# Patient Record
Sex: FEMALE | Race: BLACK OR AFRICAN AMERICAN | Employment: FULL TIME | URBAN - METROPOLITAN AREA
[De-identification: names, ages, dates, MRNs, and addresses within clinical notes are randomized per-mention and may not be internally consistent; named-entity substitution may affect disease eponyms.]

---

## 2020-10-10 ENCOUNTER — APPOINTMENT (OUTPATIENT)
Dept: GENERAL RADIOLOGY | Age: 45
End: 2020-10-10
Attending: NURSE PRACTITIONER
Payer: COMMERCIAL

## 2020-10-10 ENCOUNTER — HOSPITAL ENCOUNTER (OUTPATIENT)
Age: 45
Setting detail: OBSERVATION
Discharge: HOME | End: 2020-10-13
Attending: EMERGENCY MEDICINE | Admitting: HOSPITALIST
Payer: COMMERCIAL

## 2020-10-10 DIAGNOSIS — R07.9 CHEST PAIN, UNSPECIFIED TYPE: Primary | ICD-10-CM

## 2020-10-10 LAB
ALBUMIN SERPL-MCNC: 3.6 G/DL (ref 3.5–5)
ALBUMIN/GLOB SERPL: 0.9 {RATIO} (ref 1.1–2.2)
ALP SERPL-CCNC: 90 U/L (ref 45–117)
ALT SERPL-CCNC: 20 U/L (ref 12–78)
ANION GAP SERPL CALC-SCNC: 7 MMOL/L (ref 5–15)
AST SERPL-CCNC: 16 U/L (ref 15–37)
BASOPHILS # BLD: 0.1 K/UL (ref 0–0.1)
BASOPHILS NFR BLD: 0 % (ref 0–1)
BILIRUB SERPL-MCNC: 0.3 MG/DL (ref 0.2–1)
BNP SERPL-MCNC: 34 PG/ML
BUN SERPL-MCNC: 13 MG/DL (ref 6–20)
BUN/CREAT SERPL: 16 (ref 12–20)
CALCIUM SERPL-MCNC: 9.1 MG/DL (ref 8.5–10.1)
CHLORIDE SERPL-SCNC: 109 MMOL/L (ref 97–108)
CO2 SERPL-SCNC: 23 MMOL/L (ref 21–32)
COMMENT, HOLDF: NORMAL
CREAT SERPL-MCNC: 0.8 MG/DL (ref 0.55–1.02)
DIFFERENTIAL METHOD BLD: ABNORMAL
EOSINOPHIL # BLD: 0.1 K/UL (ref 0–0.4)
EOSINOPHIL NFR BLD: 1 % (ref 0–7)
ERYTHROCYTE [DISTWIDTH] IN BLOOD BY AUTOMATED COUNT: 15.2 % (ref 11.5–14.5)
GLOBULIN SER CALC-MCNC: 4.2 G/DL (ref 2–4)
GLUCOSE SERPL-MCNC: 140 MG/DL (ref 65–100)
HCT VFR BLD AUTO: 43.6 % (ref 35–47)
HGB BLD-MCNC: 13.4 G/DL (ref 11.5–16)
IMM GRANULOCYTES # BLD AUTO: 0.1 K/UL (ref 0–0.04)
IMM GRANULOCYTES NFR BLD AUTO: 0 % (ref 0–0.5)
LIPASE SERPL-CCNC: 67 U/L (ref 73–393)
LYMPHOCYTES # BLD: 2 K/UL (ref 0.8–3.5)
LYMPHOCYTES NFR BLD: 18 % (ref 12–49)
MAGNESIUM SERPL-MCNC: 2.1 MG/DL (ref 1.6–2.4)
MCH RBC QN AUTO: 23.8 PG (ref 26–34)
MCHC RBC AUTO-ENTMCNC: 30.7 G/DL (ref 30–36.5)
MCV RBC AUTO: 77.4 FL (ref 80–99)
MONOCYTES # BLD: 0.8 K/UL (ref 0–1)
MONOCYTES NFR BLD: 7 % (ref 5–13)
NEUTS SEG # BLD: 8.3 K/UL (ref 1.8–8)
NEUTS SEG NFR BLD: 74 % (ref 32–75)
NRBC # BLD: 0 K/UL (ref 0–0.01)
NRBC BLD-RTO: 0 PER 100 WBC
PLATELET # BLD AUTO: 252 K/UL (ref 150–400)
PMV BLD AUTO: 11 FL (ref 8.9–12.9)
POTASSIUM SERPL-SCNC: 3.4 MMOL/L (ref 3.5–5.1)
PROT SERPL-MCNC: 7.8 G/DL (ref 6.4–8.2)
RBC # BLD AUTO: 5.63 M/UL (ref 3.8–5.2)
SAMPLES BEING HELD,HOLD: NORMAL
SODIUM SERPL-SCNC: 139 MMOL/L (ref 136–145)
TROPONIN I SERPL-MCNC: <0.05 NG/ML
WBC # BLD AUTO: 11.2 K/UL (ref 3.6–11)

## 2020-10-10 PROCEDURE — 99218 HC RM OBSERVATION: CPT

## 2020-10-10 PROCEDURE — 83690 ASSAY OF LIPASE: CPT

## 2020-10-10 PROCEDURE — 36415 COLL VENOUS BLD VENIPUNCTURE: CPT

## 2020-10-10 PROCEDURE — 96374 THER/PROPH/DIAG INJ IV PUSH: CPT

## 2020-10-10 PROCEDURE — 83880 ASSAY OF NATRIURETIC PEPTIDE: CPT

## 2020-10-10 PROCEDURE — 74011250637 HC RX REV CODE- 250/637: Performed by: NURSE PRACTITIONER

## 2020-10-10 PROCEDURE — 96372 THER/PROPH/DIAG INJ SC/IM: CPT

## 2020-10-10 PROCEDURE — 83735 ASSAY OF MAGNESIUM: CPT

## 2020-10-10 PROCEDURE — 71046 X-RAY EXAM CHEST 2 VIEWS: CPT

## 2020-10-10 PROCEDURE — 74011250636 HC RX REV CODE- 250/636: Performed by: NURSE PRACTITIONER

## 2020-10-10 PROCEDURE — 80053 COMPREHEN METABOLIC PANEL: CPT

## 2020-10-10 PROCEDURE — 85025 COMPLETE CBC W/AUTO DIFF WBC: CPT

## 2020-10-10 PROCEDURE — 84484 ASSAY OF TROPONIN QUANT: CPT

## 2020-10-10 PROCEDURE — 93005 ELECTROCARDIOGRAM TRACING: CPT

## 2020-10-10 PROCEDURE — 99285 EMERGENCY DEPT VISIT HI MDM: CPT

## 2020-10-10 RX ORDER — SODIUM CHLORIDE 0.9 % (FLUSH) 0.9 %
5-40 SYRINGE (ML) INJECTION EVERY 8 HOURS
Status: DISCONTINUED | OUTPATIENT
Start: 2020-10-10 | End: 2020-10-13 | Stop reason: HOSPADM

## 2020-10-10 RX ORDER — ACETAMINOPHEN 500 MG
1000 TABLET ORAL
Status: COMPLETED | OUTPATIENT
Start: 2020-10-10 | End: 2020-10-10

## 2020-10-10 RX ORDER — FENTANYL CITRATE 50 UG/ML
50 INJECTION, SOLUTION INTRAMUSCULAR; INTRAVENOUS
Status: DISCONTINUED | OUTPATIENT
Start: 2020-10-10 | End: 2020-10-13 | Stop reason: HOSPADM

## 2020-10-10 RX ORDER — SODIUM CHLORIDE 0.9 % (FLUSH) 0.9 %
5-40 SYRINGE (ML) INJECTION AS NEEDED
Status: DISCONTINUED | OUTPATIENT
Start: 2020-10-10 | End: 2020-10-13 | Stop reason: HOSPADM

## 2020-10-10 RX ORDER — HEPARIN SODIUM 5000 [USP'U]/ML
5000 INJECTION, SOLUTION INTRAVENOUS; SUBCUTANEOUS EVERY 8 HOURS
Status: DISCONTINUED | OUTPATIENT
Start: 2020-10-10 | End: 2020-10-13 | Stop reason: HOSPADM

## 2020-10-10 RX ORDER — TRAMADOL HYDROCHLORIDE 50 MG/1
50 TABLET ORAL
Status: DISCONTINUED | OUTPATIENT
Start: 2020-10-10 | End: 2020-10-13 | Stop reason: HOSPADM

## 2020-10-10 RX ORDER — ONDANSETRON 2 MG/ML
4 INJECTION INTRAMUSCULAR; INTRAVENOUS
Status: COMPLETED | OUTPATIENT
Start: 2020-10-10 | End: 2020-10-10

## 2020-10-10 RX ORDER — NITROGLYCERIN 0.4 MG/1
0.4 TABLET SUBLINGUAL
Status: DISCONTINUED | OUTPATIENT
Start: 2020-10-10 | End: 2020-10-13 | Stop reason: HOSPADM

## 2020-10-10 RX ADMIN — ACETAMINOPHEN 1000 MG: 500 TABLET ORAL at 22:28

## 2020-10-10 RX ADMIN — ONDANSETRON 4 MG: 2 INJECTION INTRAMUSCULAR; INTRAVENOUS at 22:29

## 2020-10-10 RX ADMIN — NITROGLYCERIN 0.4 MG: 0.4 TABLET, ORALLY DISINTEGRATING SUBLINGUAL at 21:30

## 2020-10-11 ENCOUNTER — APPOINTMENT (OUTPATIENT)
Dept: VASCULAR SURGERY | Age: 45
End: 2020-10-11
Attending: NURSE PRACTITIONER
Payer: COMMERCIAL

## 2020-10-11 LAB
APPEARANCE UR: CLEAR
ATRIAL RATE: 55 BPM
ATRIAL RATE: 77 BPM
BACTERIA URNS QL MICRO: ABNORMAL /HPF
BILIRUB UR QL: NEGATIVE
CALCULATED P AXIS, ECG09: 23 DEGREES
CALCULATED P AXIS, ECG09: 34 DEGREES
CALCULATED R AXIS, ECG10: 10 DEGREES
CALCULATED R AXIS, ECG10: 13 DEGREES
CALCULATED T AXIS, ECG11: 28 DEGREES
CALCULATED T AXIS, ECG11: 56 DEGREES
CHOLEST SERPL-MCNC: 152 MG/DL
CHOLEST SERPL-MCNC: 165 MG/DL
COLOR UR: ABNORMAL
DIAGNOSIS, 93000: NORMAL
DIAGNOSIS, 93000: NORMAL
EPITH CASTS URNS QL MICRO: ABNORMAL /LPF
EST. AVERAGE GLUCOSE BLD GHB EST-MCNC: 117 MG/DL
GLUCOSE UR STRIP.AUTO-MCNC: NEGATIVE MG/DL
HBA1C MFR BLD: 5.7 % (ref 4–5.6)
HDLC SERPL-MCNC: 76 MG/DL
HDLC SERPL-MCNC: 77 MG/DL
HDLC SERPL: 2 {RATIO} (ref 0–5)
HDLC SERPL: 2.2 {RATIO} (ref 0–5)
HGB UR QL STRIP: ABNORMAL
KETONES UR QL STRIP.AUTO: ABNORMAL MG/DL
LDLC SERPL CALC-MCNC: 58.6 MG/DL (ref 0–100)
LDLC SERPL CALC-MCNC: 77.8 MG/DL (ref 0–100)
LEUKOCYTE ESTERASE UR QL STRIP.AUTO: NEGATIVE
LIPID PROFILE,FLP: NORMAL
LIPID PROFILE,FLP: NORMAL
MUCOUS THREADS URNS QL MICRO: ABNORMAL /LPF
NITRITE UR QL STRIP.AUTO: NEGATIVE
P-R INTERVAL, ECG05: 140 MS
P-R INTERVAL, ECG05: 142 MS
PH UR STRIP: 5 [PH] (ref 5–8)
PROT UR STRIP-MCNC: ABNORMAL MG/DL
Q-T INTERVAL, ECG07: 364 MS
Q-T INTERVAL, ECG07: 452 MS
QRS DURATION, ECG06: 76 MS
QRS DURATION, ECG06: 80 MS
QTC CALCULATION (BEZET), ECG08: 411 MS
QTC CALCULATION (BEZET), ECG08: 432 MS
RBC #/AREA URNS HPF: ABNORMAL /HPF (ref 0–5)
SP GR UR REFRACTOMETRY: 1.03 (ref 1–1.03)
TRIGL SERPL-MCNC: 56 MG/DL (ref ?–150)
TRIGL SERPL-MCNC: 82 MG/DL (ref ?–150)
TROPONIN I SERPL-MCNC: <0.05 NG/ML
UR CULT HOLD, URHOLD: NORMAL
UROBILINOGEN UR QL STRIP.AUTO: 1 EU/DL (ref 0.2–1)
VENTRICULAR RATE, ECG03: 55 BPM
VENTRICULAR RATE, ECG03: 77 BPM
VLDLC SERPL CALC-MCNC: 11.2 MG/DL
VLDLC SERPL CALC-MCNC: 16.4 MG/DL
WBC URNS QL MICRO: ABNORMAL /HPF (ref 0–4)

## 2020-10-11 PROCEDURE — 81001 URINALYSIS AUTO W/SCOPE: CPT

## 2020-10-11 PROCEDURE — 74011250637 HC RX REV CODE- 250/637: Performed by: NURSE PRACTITIONER

## 2020-10-11 PROCEDURE — 99218 HC RM OBSERVATION: CPT

## 2020-10-11 PROCEDURE — 96372 THER/PROPH/DIAG INJ SC/IM: CPT

## 2020-10-11 PROCEDURE — 93005 ELECTROCARDIOGRAM TRACING: CPT

## 2020-10-11 PROCEDURE — 80061 LIPID PANEL: CPT

## 2020-10-11 PROCEDURE — 93880 EXTRACRANIAL BILAT STUDY: CPT

## 2020-10-11 PROCEDURE — 36415 COLL VENOUS BLD VENIPUNCTURE: CPT

## 2020-10-11 PROCEDURE — 84484 ASSAY OF TROPONIN QUANT: CPT

## 2020-10-11 PROCEDURE — 74011250637 HC RX REV CODE- 250/637: Performed by: HOSPITALIST

## 2020-10-11 PROCEDURE — 83036 HEMOGLOBIN GLYCOSYLATED A1C: CPT

## 2020-10-11 PROCEDURE — 74011250636 HC RX REV CODE- 250/636: Performed by: HOSPITALIST

## 2020-10-11 RX ORDER — IBUPROFEN 600 MG/1
600 TABLET ORAL ONCE
Status: COMPLETED | OUTPATIENT
Start: 2020-10-11 | End: 2020-10-11

## 2020-10-11 RX ADMIN — HEPARIN SODIUM 5000 UNITS: 5000 INJECTION INTRAVENOUS; SUBCUTANEOUS at 23:43

## 2020-10-11 RX ADMIN — HEPARIN SODIUM 5000 UNITS: 5000 INJECTION INTRAVENOUS; SUBCUTANEOUS at 00:43

## 2020-10-11 RX ADMIN — IBUPROFEN 600 MG: 600 TABLET, FILM COATED ORAL at 09:54

## 2020-10-11 RX ADMIN — HEPARIN SODIUM 5000 UNITS: 5000 INJECTION INTRAVENOUS; SUBCUTANEOUS at 15:21

## 2020-10-11 RX ADMIN — HEPARIN SODIUM 5000 UNITS: 5000 INJECTION INTRAVENOUS; SUBCUTANEOUS at 06:48

## 2020-10-11 RX ADMIN — TRAMADOL HYDROCHLORIDE 50 MG: 50 TABLET ORAL at 15:21

## 2020-10-11 NOTE — CONSULTS
Cardiology Note dictated # [id number]  Imp:   Atypical CP with strong pleuritic and tactile component  Plan:  Agree with stress test as ordered  Further recommendations to follow  Thank you for this referral  Pramod San MD  Interventional Cardiology   [facility]

## 2020-10-11 NOTE — H&P
HISTORY AND PHYSICAL      PCP: None  History source: the patient      CC: chest pain      HPI: 41 y.o lady with asthma and GERD who presents with chest pain. Symptoms began earlier in the evening. She finished eating and developed sharp substernal chest pain. No exacerbating or alleviating factors. Associated symptoms include nausea, flushing, and left hand paresthesias. It lasted for about 15 minutes until EMS came and gave her nitroglycerin. She denies tobacco use, family history of heart disease. About five years ago, she had a stress test for palpitations that was reportedly normal. She then had what sounds like a loop recorder which was removed about a year ago. She is currently pain-free      PMH/PSH:  Past Medical History:   Diagnosis Date    Abnormal Pap smear     2008    Asthma     Asthma     GERD (gastroesophageal reflux disease)      Past Surgical History:   Procedure Laterality Date    HX OTHER SURGICAL      d&c 2005       Home meds:   Prior to Admission medications    Medication Sig Start Date End Date Taking? Authorizing Provider   cephALEXin (KEFLEX) 500 mg capsule Take 1 Cap by mouth three (3) times daily. 3/12/14   Angelica Joseph, DO   albuterol (PROVENTIL HFA, VENTOLIN HFA) 90 mcg/actuation inhaler Take 2 Puffs by inhalation every four (4) hours as needed. Indications: BRONCHOSPASM PREVENTION    Provider, Historical       Allergies:  No Known Allergies    FH:  Family History   Problem Relation Age of Onset    Cancer Father     Stroke Maternal Grandfather     Hypertension Maternal Grandfather     Diabetes Paternal Grandmother     Hypertension Paternal Grandmother        SH:  Social History     Tobacco Use    Smoking status: Never Smoker   Substance Use Topics    Alcohol use: No       ROS: A comprehensive review of systems was negative except for that written in the HPI.       PHYSICAL EXAM:  Visit Vitals  /86 (BP 1 Location: Left arm, BP Patient Position: At rest)   Pulse 72 Resp 16   SpO2 97%       Gen: NAD, non-toxic  HEENT: anicteric sclerae, normal conjunctiva, oropharynx clear, MM moist  Neck: supple, trachea midline, no adenopathy  Heart: RRR, soft systolic murmur, no JVD, no peripheral edema. Pain is reproducible with palpation of left sternochondral junctions  Lungs: CTA b/l, non-labored respirations  Abd: soft, NT, ND, BS+  Extr: warm  Skin: dry, no rash  Neuro: CN II-XII grossly intact, normal speech, moves all extremities  Psych: normal mood, appropriate affect      Labs/Imaging:  Recent Results (from the past 24 hour(s))   EKG, 12 LEAD, INITIAL    Collection Time: 10/10/20  9:18 PM   Result Value Ref Range    Ventricular Rate 77 BPM    Atrial Rate 77 BPM    P-R Interval 140 ms    QRS Duration 76 ms    Q-T Interval 364 ms    QTC Calculation (Bezet) 411 ms    Calculated P Axis 34 degrees    Calculated R Axis 10 degrees    Calculated T Axis 56 degrees    Diagnosis       Normal sinus rhythm  Nonspecific T wave abnormality  When compared with ECG of 11-MAR-2014 22:12,  T wave inversion no longer evident in Inferior leads  Nonspecific T wave abnormality has replaced inverted T waves in Anterolateral   leads     CBC WITH AUTOMATED DIFF    Collection Time: 10/10/20  9:26 PM   Result Value Ref Range    WBC 11.2 (H) 3.6 - 11.0 K/uL    RBC 5.63 (H) 3.80 - 5.20 M/uL    HGB 13.4 11.5 - 16.0 g/dL    HCT 43.6 35.0 - 47.0 %    MCV 77.4 (L) 80.0 - 99.0 FL    MCH 23.8 (L) 26.0 - 34.0 PG    MCHC 30.7 30.0 - 36.5 g/dL    RDW 15.2 (H) 11.5 - 14.5 %    PLATELET 487 710 - 078 K/uL    MPV 11.0 8.9 - 12.9 FL    NRBC 0.0 0  WBC    ABSOLUTE NRBC 0.00 0.00 - 0.01 K/uL    NEUTROPHILS 74 32 - 75 %    LYMPHOCYTES 18 12 - 49 %    MONOCYTES 7 5 - 13 %    EOSINOPHILS 1 0 - 7 %    BASOPHILS 0 0 - 1 %    IMMATURE GRANULOCYTES 0 0.0 - 0.5 %    ABS. NEUTROPHILS 8.3 (H) 1.8 - 8.0 K/UL    ABS. LYMPHOCYTES 2.0 0.8 - 3.5 K/UL    ABS. MONOCYTES 0.8 0.0 - 1.0 K/UL    ABS.  EOSINOPHILS 0.1 0.0 - 0.4 K/UL ABS. BASOPHILS 0.1 0.0 - 0.1 K/UL    ABS. IMM. GRANS. 0.1 (H) 0.00 - 0.04 K/UL    DF AUTOMATED     METABOLIC PANEL, COMPREHENSIVE    Collection Time: 10/10/20  9:26 PM   Result Value Ref Range    Sodium 139 136 - 145 mmol/L    Potassium 3.4 (L) 3.5 - 5.1 mmol/L    Chloride 109 (H) 97 - 108 mmol/L    CO2 23 21 - 32 mmol/L    Anion gap 7 5 - 15 mmol/L    Glucose 140 (H) 65 - 100 mg/dL    BUN 13 6 - 20 MG/DL    Creatinine 0.80 0.55 - 1.02 MG/DL    BUN/Creatinine ratio 16 12 - 20      GFR est AA >60 >60 ml/min/1.73m2    GFR est non-AA >60 >60 ml/min/1.73m2    Calcium 9.1 8.5 - 10.1 MG/DL    Bilirubin, total 0.3 0.2 - 1.0 MG/DL    ALT (SGPT) 20 12 - 78 U/L    AST (SGOT) 16 15 - 37 U/L    Alk. phosphatase 90 45 - 117 U/L    Protein, total 7.8 6.4 - 8.2 g/dL    Albumin 3.6 3.5 - 5.0 g/dL    Globulin 4.2 (H) 2.0 - 4.0 g/dL    A-G Ratio 0.9 (L) 1.1 - 2.2     TROPONIN I    Collection Time: 10/10/20  9:26 PM   Result Value Ref Range    Troponin-I, Qt. <0.05 <0.05 ng/mL   NT-PRO BNP    Collection Time: 10/10/20  9:26 PM   Result Value Ref Range    NT pro-BNP 34 <125 PG/ML   LIPASE    Collection Time: 10/10/20  9:26 PM   Result Value Ref Range    Lipase 67 (L) 73 - 393 U/L   MAGNESIUM    Collection Time: 10/10/20  9:26 PM   Result Value Ref Range    Magnesium 2.1 1.6 - 2.4 mg/dL   SAMPLES BEING HELD    Collection Time: 10/10/20  9:26 PM   Result Value Ref Range    SAMPLES BEING HELD 1RED,1BLU     COMMENT        Add-on orders for these samples will be processed based on acceptable specimen integrity and analyte stability, which may vary by analyte.        Recent Labs     10/10/20  2126   WBC 11.2*   HGB 13.4   HCT 43.6        Recent Labs     10/10/20  2126      K 3.4*   *   CO2 23   BUN 13   CREA 0.80   *   CA 9.1   MG 2.1     Recent Labs     10/10/20  2126   ALT 20   AP 90   TBILI 0.3   TP 7.8   ALB 3.6   GLOB 4.2*   LPSE 67*       Recent Labs     10/10/20  2126   TROIQ <0.05       No results for input(s): INR, PTP, APTT, INREXT in the last 72 hours. No results for input(s): PH, PCO2, PO2 in the last 72 hours.       Xr Chest Pa Lat    Result Date: 10/10/2020  IMPRESSION: Normal chest.          Assessment & Plan:     Chest pain: seems non-cardiac in nature  -trend biomarkers  -monitor on tele  -stress test in AM, consult cardiology in AM  -check a1c, lipid panel    Asthma: stable    GERD    DVT ppx: sq heparin  Code status: full  Disposition: home when ready    Signed By: Carlos Arias MD     October 10, 2020

## 2020-10-11 NOTE — PROGRESS NOTES
[facility]  Hospitalist Group                                                                                          Hospitalist Progress Note  Willow Aguliar NP  Answering service: [phone] or [phone] from in house phone        Date of Service:  10/11/2020  NAME:  Yane Be  :  1975  MRN:  [id number]      Admission Summary:   Per H&P: HPI: 41 y.o lady with asthma and GERD who presents with chest pain. Symptoms began earlier in the evening. She finished eating and developed sharp substernal chest pain. No exacerbating or alleviating factors. Associated symptoms include nausea, flushing, and left hand paresthesias. It lasted for about 15 minutes until EMS came and gave her nitroglycerin. She denies tobacco use, family history of heart disease. About five years ago, she had a stress test for palpitations that was reportedly normal. She then had what sounds like a loop recorder which was removed about a year ago. She is currently pain-free    Interval history / Subjective:    Seen and examined patient for follow up of chest pain. States that the chest pain has resolved. Currently only having chest soreness that increases with deep breathing. Complaining of headache. States headache started after having Nitroglycerin last night. Explained to her that headache is a side effect of the nitro. States that she did have a loop monitor previously but was not treated for anything. Was following with a cardiologist in [place] but has not been there recently. No acute distress noted. Denies any syncope, dizziness, shortness of breath, nausea, vomiting or diarrhea. Assessment & Plan:     Atypical chest pain   - Trop's negative x 4   - A1c and lipid panel completed. - Stress test pending   - Cardiology following   - Continue telemetry   - Carotid duplex showing incidental finding of 2 thyroid nodules that are cystic in appearance. Discussed with patient.  To be followed up outpatient with PCP. H/o asthma   - Stable       Code status: Full   DVT prophylaxis: SCDs    Care Plan discussed with: Patient/Family and Nurse  Anticipated Disposition: Home w/Family  Anticipated Discharge: 24 hours to 48 hours     Hospital Problems  Never Reviewed          Codes Class Noted POA    Chest pain ICD-10-CM: R07.9  ICD-9-CM: 786.50  10/10/2020 Unknown                Review of Systems:   A comprehensive review of systems was negative except for that written in the HPI. Vital Signs:    Last 24hrs VS reviewed since prior progress note. Most recent are:  Visit Vitals  /84 (BP 1 Location: Left arm, BP Patient Position: At rest)   Pulse 72   Temp 98.8 °F (37.1 °C)   Resp 15   SpO2 99%         Intake/Output Summary (Last 24 hours) at 10/11/2020 1732  Last data filed at 10/11/2020 1520  Gross per 24 hour   Intake 240 ml   Output --   Net 240 ml        Physical Examination:             Constitutional:  No acute distress, cooperative, pleasant, answers questions   ENT:  Oral mucosa moist, oropharynx benign. Resp:  CTA bilaterally. On room air. No wheezing/rhonchi/rales. No accessory muscle use   CV:  Regular rhythm, normal rate, no murmurs, gallops, rubs. Chest tenderness to palpation. GI:  Soft, non distended, non tender. normoactive bowel sounds, no hepatosplenomegaly     Musculoskeletal:  No edema, warm, 2+ pulses throughout    Neurologic:  Moves all extremities. Follows commands AAOx3, CN II-XII reviewed     Psych:  Good insight, Not anxious nor agitated.   Skin:  Good turgor, no rashes or ulcers       Data Review:    Review and/or order of clinical lab test  Review and/or order of tests in the radiology section of CPT  Review and/or order of tests in the medicine section of CPT      Labs:     Recent Labs     10/10/20  2126   WBC 11.2*   HGB 13.4   HCT 43.6        Recent Labs     10/10/20  2126      K 3.4*   *   CO2 23   BUN 13   CREA 0.80   *   CA 9.1   MG 2.1     Recent Labs     10/10/20  2126   ALT 20   AP 90   TBILI 0.3   TP 7.8   ALB 3.6   GLOB 4.2*   LPSE 67*     No results for input(s): INR, PTP, APTT, INREXT in the last 72 hours. No results for input(s): FE, TIBC, PSAT, FERR in the last 72 hours. No results found for: FOL, RBCF   No results for input(s): PH, PCO2, PO2 in the last 72 hours.   Recent Labs     10/11/20  0651 10/11/20  0503 10/11/20  0049   TROIQ <0.05 <0.05 <0.05     Lab Results   Component Value Date/Time    Cholesterol, total 165 10/11/2020 06:51 AM    HDL Cholesterol 76 10/11/2020 06:51 AM    LDL, calculated 77.8 10/11/2020 06:51 AM    Triglyceride 56 10/11/2020 06:51 AM    CHOL/HDL Ratio 2.2 10/11/2020 06:51 AM     No results found for: Flor Reagan  Lab Results   Component Value Date/Time    Color YELLOW/STRAW 10/11/2020 12:43 AM    Appearance CLEAR 10/11/2020 12:43 AM    Specific gravity 1.030 10/11/2020 12:43 AM    pH (UA) 5.0 10/11/2020 12:43 AM    Protein TRACE (A) 10/11/2020 12:43 AM    Glucose Negative 10/11/2020 12:43 AM    Ketone TRACE (A) 10/11/2020 12:43 AM    Bilirubin Negative 10/11/2020 12:43 AM    Urobilinogen 1.0 10/11/2020 12:43 AM    Nitrites Negative 10/11/2020 12:43 AM    Leukocyte Esterase Negative 10/11/2020 12:43 AM    Epithelial cells FEW 10/11/2020 12:43 AM    Bacteria 1+ (A) 10/11/2020 12:43 AM    WBC 5-10 10/11/2020 12:43 AM    RBC 0-5 10/11/2020 12:43 AM         Medications Reviewed:     Current Facility-Administered Medications   Medication Dose Route Frequency    influenza vaccine 2020-21 (6 mos+)(PF) (FLUARIX/FLULAVAL/FLUZONE QUAD) injection 0.5 mL  0.5 mL IntraMUSCular PRIOR TO DISCHARGE    nitroglycerin (NITROSTAT) tablet 0.4 mg  0.4 mg SubLINGual Q5MIN PRN    sodium chloride (NS) flush 5-40 mL  5-40 mL IntraVENous Q8H    sodium chloride (NS) flush 5-40 mL  5-40 mL IntraVENous PRN    heparin (porcine) injection 5,000 Units  5,000 Units SubCUTAneous Q8H    traMADoL (ULTRAM) tablet 50 mg  50 mg Oral Q6H PRN  fentaNYL citrate (PF) injection 50 mcg  50 mcg IntraVENous Q4H PRN     ______________________________________________________________________  EXPECTED LENGTH OF STAY: - - -  ACTUAL LENGTH OF STAY:          0                 Sandoval Delvalle NP

## 2020-10-11 NOTE — ED TRIAGE NOTES
Patient arrives to the ED with c/o chest pain radiating to her left arm @ 2 hrs ago, states chest pain 6/10, + SOB and nausea, no vomiting noted.  + cardiac monitor in past.

## 2020-10-11 NOTE — PROGRESS NOTES
Problem: Pain  Goal: *Control of Pain  10/11/2020 1723 by Stephanie Shepherd April M  Outcome: Progressing Towards Goal  10/11/2020 1723 by Stephanie Shepherd April M  Outcome: Progressing Towards Goal  Goal: *PALLIATIVE CARE:  Alleviation of Pain  10/11/2020 1723 by Stephanie hSepherd April M  Outcome: Progressing Towards Goal  10/11/2020 1723 by Stephanie Shepherd April M  Outcome: Progressing Towards Goal     Problem: Patient Education: Go to Patient Education Activity  Goal: Patient/Family Education  10/11/2020 1723 by Stephanie Shepherd April M  Outcome: Progressing Towards Goal  10/11/2020 1723 by Stephanie Shepherd April M  Outcome: Progressing Towards Goal     Problem: Falls - Risk of  Goal: *Absence of Falls  Description: Document Skye Alex Fall Risk and appropriate interventions in the flowsheet.   10/11/2020 1723 by Flip Abbott  Outcome: Progressing Towards Goal  Note: Fall Risk Interventions:            Medication Interventions: Teach patient to arise slowly, Patient to call before getting OOB                10/11/2020 1723 by Stephanie Shepherd April M  Outcome: Progressing Towards Goal  Note: Fall Risk Interventions:            Medication Interventions: Teach patient to arise slowly, Patient to call before getting OOB                   Problem: Patient Education: Go to Patient Education Activity  Goal: Patient/Family Education  10/11/2020 1723 by Stephanie Shepherd April M  Outcome: Progressing Towards Goal  10/11/2020 1723 by Stephanie Shepherd April M  Outcome: Progressing Towards Goal     Problem: General Medical Care Plan  Goal: *Vital signs within specified parameters  Outcome: Progressing Towards Goal  Goal: *Labs within defined limits  Outcome: Progressing Towards Goal  Goal: *Absence of infection signs and symptoms  Outcome: Progressing Towards Goal  Goal: *Optimal pain control at patient's stated goal  Outcome: Progressing Towards Goal  Goal: *Skin integrity maintained  Outcome: Progressing Towards Goal  Goal: *Fluid volume balance  Outcome: Progressing Towards Goal  Goal: *Optimize nutritional status  Outcome: Progressing Towards Goal  Goal: *Anxiety reduced or absent  Outcome: Progressing Towards Goal  Goal: *Progressive mobility and function (eg: ADL's)  Outcome: Progressing Towards Goal     Problem: Patient Education: Go to Patient Education Activity  Goal: Patient/Family Education  Outcome: Progressing Towards Goal

## 2020-10-11 NOTE — CONSULTS
[address]    Name:  Mag Sharp  MR#:  [id number]  :  1975  ACCOUNT #:  [de-identified]  DATE OF SERVICE:  10/11/2020    REFERRING PHYSICIAN:  Dr. Shakira Ospina:  This is a 43-year-old woman admitted yesterday evening or early this morning with chest pain. The pain is characterized as sharp chest wall tenderness, worse with deep breathing and coughing. It has been ongoing for, I believe, few days. The patient was concerned that it may be in heart. No cardiac history. She did have an implantable loop monitor for a while, was removed a year ago. She was not treated for anything specific and takes no medications at this time. The monitoring was done for palpitations without syncope. PAST MEDICAL HISTORY:  Includes;  1. GERD. 2.  Asthma. REVIEW OF SYSTEMS:  Negative for dyspnea, orthopnea, diaphoresis, syncope, palpitations, edema, or claudication. There is no bleeding per urine or stool. No hemoptysis. No fever, chills, or unexplained weight loss. PHYSICAL EXAMINATION:  GENERAL:  A well-developed, pleasant, healthy-appearing middle-aged woman. VITAL SIGNS:  As follows; blood pressure is 106/69, respirations 15, temperature is 99.1, sats 99% on room air. HEENT:  Unremarkable. NECK:  Supple. No adenopathy. CHEST:  Chest wall is quite tender to palpation and duplicates her presenting complaint. LUNGS:  Anteriorly are clear. HEART:  Regular, moderate rhythm. No S3 gallop or friction rub. No thrills, lifts, or heaves. ABDOMEN:  Soft, nontender. No bruits. EXTREMITIES:  No edema. Normal pedal pulses. NEUROLOGIC:  Exam is normal.    IMAGING:  EKG demonstrates sinus bradycardia, 55 beats a minute. Nondiagnostic ST-T changes. No change from the previous tracing. LABORATORY DATA:  Negative. Three negative troponins. Otherwise, labs were unremarkable.     IMPRESSION:  Atypical chest pain with a strong pleuritic and strong tactile component, atypical for ischemic pain. PLAN:  I agree with stress test ordered by Dr. Joelle Kong. Further recommendations to follow.     Thank you for the referral.      Isela Jackson MD      [id number]  D:  10/11/2020 14:21  T:  10/11/2020 17:13  JOB #:  [id number]

## 2020-10-11 NOTE — PROGRESS NOTES
Bedside shift change report given to Nghia RN (oncoming nurse) by April, RN (offgoing nurse). Report included the following information SBAR, Kardex, Intake/Output, MAR, Accordion and Cardiac Rhythm Sinus vianney-NSR.

## 2020-10-11 NOTE — ED PROVIDER NOTES
This is a 18-year-old female with past medical history of asthma, GERD, and unspecified cardiac dysrhythmia (suspected based on patient's reports of wearing monitor device from prior cardiologist > 1 year ago) who presents the ER today for evaluation of chest pain. Patient states that she was sitting down eating dinner earlier this evening and developed a sudden onset of intense substernal chest pain described as \"sharp and tight\" that was also associated with a sensation of feeling hot/slushed, shortness of breath, dizziness, nausea, and left arm paresthesias. Patient denies history of similar episodes. She states that her pain was very intense, but was lowered to a 6/10 out of intensity after receiving aspirin and 1 nitroglycerin from EMS. Of note, patient states that she developed new onset left arm paresthesias today several hours prior to the onset of her chest pain. Patient states that she is not currently followed by cardiology, and had a negative cardiac stress test years ago when she was having palpitations. She denies any family history of sudden cardiac death or premature heart disease. She does not smoke cigarettes, use illicit drugs, or drink alcohol.     ROS negative for: Recent illness, fever/chills, cough, vomiting, reflux-like symptoms, abdominal pain/distention, extremity swelling, extremity weakness, palpitations, syncope, jaw pain/back pain             Past Medical History:   Diagnosis Date    Abnormal Pap smear     2008    Asthma     Asthma     GERD (gastroesophageal reflux disease)        Past Surgical History:   Procedure Laterality Date    HX OTHER SURGICAL      d&c 2005         Family History:   Problem Relation Age of Onset    Cancer Father     Stroke Maternal Grandfather     Hypertension Maternal Grandfather     Diabetes Paternal Grandmother     Hypertension Paternal Grandmother        Social History     Socioeconomic History    Marital status:      Spouse name: Not on file    Number of children: Not on file    Years of education: Not on file    Highest education level: Not on file   Occupational History    Not on file   Social Needs    Financial resource strain: Not on file    Food insecurity     Worry: Not on file     Inability: Not on file    Transportation needs     Medical: Not on file     Non-medical: Not on file   Tobacco Use    Smoking status: Never Smoker   Substance and Sexual Activity    Alcohol use: No    Drug use: No    Sexual activity: Yes     Partners: Male     Birth control/protection: None   Lifestyle    Physical activity     Days per week: Not on file     Minutes per session: Not on file    Stress: Not on file   Relationships    Social connections     Talks on phone: Not on file     Gets together: Not on file     Attends Mu-ism service: Not on file     Active member of club or organization: Not on file     Attends meetings of clubs or organizations: Not on file     Relationship status: Not on file    Intimate partner violence     Fear of current or ex partner: Not on file     Emotionally abused: Not on file     Physically abused: Not on file     Forced sexual activity: Not on file   Other Topics Concern    Not on file   Social History Narrative    Not on file         ALLERGIES: Patient has no known allergies. Review of Systems   Constitutional: Negative for chills and fever. HENT: Negative for sore throat. Eyes: Negative for visual disturbance. Respiratory: Positive for chest tightness and shortness of breath. Negative for cough. Cardiovascular: Positive for chest pain. Negative for palpitations. Gastrointestinal: Positive for nausea. Negative for abdominal distention, abdominal pain and vomiting. Genitourinary: Negative for dysuria and flank pain. Musculoskeletal: Negative for myalgias. Skin: Negative for rash. Neurological: Positive for dizziness. Psychiatric/Behavioral: Negative for dysphoric mood. Vitals:    10/10/20 2117   BP: 130/86   Pulse: 72   Resp: 16   SpO2: 97%            Physical Exam  Vitals signs and nursing note reviewed. Constitutional:       General: She is in acute distress. Appearance: Normal appearance. She is well-developed. She is not ill-appearing or diaphoretic. HENT:      Head: Normocephalic and atraumatic. Right Ear: External ear normal.      Left Ear: External ear normal.      Nose: Nose normal.      Mouth/Throat:      Mouth: Mucous membranes are moist.      Pharynx: Oropharynx is clear. Eyes:      General: No scleral icterus. Extraocular Movements: Extraocular movements intact. Conjunctiva/sclera: Conjunctivae normal.      Pupils: Pupils are equal, round, and reactive to light. Neck:      Musculoskeletal: Normal range of motion and neck supple. No neck rigidity or muscular tenderness. Cardiovascular:      Rate and Rhythm: Normal rate and regular rhythm. Chest Wall: No thrill. Pulses: Normal pulses. Radial pulses are 2+ on the right side and 2+ on the left side. Heart sounds: Normal heart sounds. No systolic murmur. No S3 sounds. Pulmonary:      Effort: Tachypnea present. Breath sounds: Normal breath sounds and air entry. Chest:      Chest wall: Tenderness present. Comments: Tenderness elicited with palpation along sternum  Abdominal:      General: Abdomen is flat. Bowel sounds are normal.      Palpations: Abdomen is soft. Tenderness: There is no abdominal tenderness. Musculoskeletal: Normal range of motion. Right lower leg: No edema. Left lower leg: No edema. Skin:     General: Skin is warm and dry. Coloration: Skin is not pale. Neurological:      General: No focal deficit present. Mental Status: She is alert and oriented to person, place, and time. Psychiatric:         Mood and Affect: Mood normal.         Behavior: Behavior normal.         Thought Content:  Thought content normal.         Judgment: Judgment normal.          [facility]        VITAL SIGNS:  Patient Vitals for the past 4 hrs:   Pulse Resp BP SpO2   10/10/20 2117 72 16 130/86 97 %         LABS:  Recent Results (from the past 6 hour(s))   EKG, 12 LEAD, INITIAL    Collection Time: 10/10/20  9:18 PM   Result Value Ref Range    Ventricular Rate 77 BPM    Atrial Rate 77 BPM    P-R Interval 140 ms    QRS Duration 76 ms    Q-T Interval 364 ms    QTC Calculation (Bezet) 411 ms    Calculated P Axis 34 degrees    Calculated R Axis 10 degrees    Calculated T Axis 56 degrees    Diagnosis       Normal sinus rhythm  Nonspecific T wave abnormality  When compared with ECG of 11-MAR-2014 22:12,  T wave inversion no longer evident in Inferior leads  Nonspecific T wave abnormality has replaced inverted T waves in Anterolateral   leads     CBC WITH AUTOMATED DIFF    Collection Time: 10/10/20  9:26 PM   Result Value Ref Range    WBC 11.2 (H) 3.6 - 11.0 K/uL    RBC 5.63 (H) 3.80 - 5.20 M/uL    HGB 13.4 11.5 - 16.0 g/dL    HCT 43.6 35.0 - 47.0 %    MCV 77.4 (L) 80.0 - 99.0 FL    MCH 23.8 (L) 26.0 - 34.0 PG    MCHC 30.7 30.0 - 36.5 g/dL    RDW 15.2 (H) 11.5 - 14.5 %    PLATELET 558 850 - 804 K/uL    MPV 11.0 8.9 - 12.9 FL    NRBC 0.0 0  WBC    ABSOLUTE NRBC 0.00 0.00 - 0.01 K/uL    NEUTROPHILS 74 32 - 75 %    LYMPHOCYTES 18 12 - 49 %    MONOCYTES 7 5 - 13 %    EOSINOPHILS 1 0 - 7 %    BASOPHILS 0 0 - 1 %    IMMATURE GRANULOCYTES 0 0.0 - 0.5 %    ABS. NEUTROPHILS 8.3 (H) 1.8 - 8.0 K/UL    ABS. LYMPHOCYTES 2.0 0.8 - 3.5 K/UL    ABS. MONOCYTES 0.8 0.0 - 1.0 K/UL    ABS. EOSINOPHILS 0.1 0.0 - 0.4 K/UL    ABS. BASOPHILS 0.1 0.0 - 0.1 K/UL    ABS. IMM.  GRANS. 0.1 (H) 0.00 - 0.04 K/UL    DF AUTOMATED     METABOLIC PANEL, COMPREHENSIVE    Collection Time: 10/10/20  9:26 PM   Result Value Ref Range    Sodium 139 136 - 145 mmol/L    Potassium 3.4 (L) 3.5 - 5.1 mmol/L    Chloride 109 (H) 97 - 108 mmol/L    CO2 23 21 - 32 mmol/L    Anion gap 7 5 - 15 mmol/L Glucose 140 (H) 65 - 100 mg/dL    BUN 13 6 - 20 MG/DL    Creatinine 0.80 0.55 - 1.02 MG/DL    BUN/Creatinine ratio 16 12 - 20      GFR est AA >60 >60 ml/min/1.73m2    GFR est non-AA >60 >60 ml/min/1.73m2    Calcium 9.1 8.5 - 10.1 MG/DL    Bilirubin, total 0.3 0.2 - 1.0 MG/DL    ALT (SGPT) 20 12 - 78 U/L    AST (SGOT) 16 15 - 37 U/L    Alk. phosphatase 90 45 - 117 U/L    Protein, total 7.8 6.4 - 8.2 g/dL    Albumin 3.6 3.5 - 5.0 g/dL    Globulin 4.2 (H) 2.0 - 4.0 g/dL    A-G Ratio 0.9 (L) 1.1 - 2.2     TROPONIN I    Collection Time: 10/10/20  9:26 PM   Result Value Ref Range    Troponin-I, Qt. <0.05 <0.05 ng/mL   NT-PRO BNP    Collection Time: 10/10/20  9:26 PM   Result Value Ref Range    NT pro-BNP 34 <125 PG/ML   LIPASE    Collection Time: 10/10/20  9:26 PM   Result Value Ref Range    Lipase 67 (L) 73 - 393 U/L   MAGNESIUM    Collection Time: 10/10/20  9:26 PM   Result Value Ref Range    Magnesium 2.1 1.6 - 2.4 mg/dL   SAMPLES BEING HELD    Collection Time: 10/10/20  9:26 PM   Result Value Ref Range    SAMPLES BEING HELD 1RED,1BLU     COMMENT        Add-on orders for these samples will be processed based on acceptable specimen integrity and analyte stability, which may vary by analyte. IMAGING:  XR CHEST PA LAT   Final Result   IMPRESSION: Normal chest.            Medications During Visit:  Medications   nitroglycerin (NITROSTAT) tablet 0.4 mg (0.4 mg SubLINGual Given 10/10/20 2130)   ondansetron (ZOFRAN) injection 4 mg (4 mg IntraVENous Given 10/10/20 2229)   acetaminophen (TYLENOL) tablet 1,000 mg (1,000 mg Oral Given 10/10/20 2228)         DECISION MAKING:  Naye Meléndez is a 41 y.o. female who comes in as above. 9:50 PM:  Patient reports complete resolution of chest pain after receiving second nitroglycerin tablet. She now complains of headache, Tylenol ordered. 11:01 PM  Reevaluation the patient this time indicates that she is completely pain-free  (With the exception of a headache).   EKG reveals no obvious signs of ischemia. Chest x-ray normal.  Labs unremarkable, including cardiac enzymes. Heart score 4. Patient did present with symptoms very concerning for an ACS, and her symptoms seem to improve significantly with 2 sublingual nitroglycerins. I discussed with the patient that she would likely benefit from expedited cardiac work-up due to the severity of her symptoms. Will plan on admitting the patient under hospital medicine for further management. IMPRESSION:  1. Chest pain, unspecified type        DISPOSITION:  Admitted    Perfect Serve Consult for Admission  11:04 PM    ED Room Number: ER09/09  Patient Name and age: Fahad Roblse 41 y.o.  female  Working Diagnosis:   1. Chest pain, unspecified type        COVID-19 Suspicion:  no  Sepsis present:  no  Reassessment needed: no  Code Status:  Full Code  Readmission: no  Isolation Requirements:  no  Recommended Level of Care:  telemetry  Department:[facility] Adult ED - 21   Other: Chest pain concerning for ACS with negative work-up.   Would likely benefit from urgent stress test.

## 2020-10-11 NOTE — ED NOTES
TRANSFER - OUT REPORT:    Verbal report given to [facility] (name[facility] on Prakash Christian  being transferred to [facility](unit) for routine progression of care       Report consisted of patients Situation, Background, Assessment and   Recommendations(SBAR). Information from the following report(s) SBAR, ED Summary, [facility] and Recent Results was reviewed with the receiving nurse. Lines:   Peripheral IV 10/10/20 Right Forearm (Active)   Site Assessment Clean, dry, & intact 10/10/20 2132   Phlebitis Assessment 0 10/10/20 2132   Infiltration Assessment 0 10/10/20 2132   Dressing Status Clean, dry, & intact 10/10/20 2132   Dressing Type Transparent 10/10/20 2132   Hub Color/Line Status Pink;Patent; Flushed 10/10/20 2132   Action Taken Blood drawn 10/10/20 2132        Opportunity for questions and clarification was provided.

## 2020-10-12 LAB
LEFT CCA DIST DIAS: 25.5 CM/S
LEFT CCA DIST SYS: 87.7 CM/S
LEFT CCA PROX DIAS: 24 CM/S
LEFT CCA PROX SYS: 98.2 CM/S
LEFT ECA DIAS: 18.92 CM/S
LEFT ECA SYS: 69.2 CM/S
LEFT ICA DIST DIAS: 30.8 CM/S
LEFT ICA DIST SYS: 78.5 CM/S
LEFT ICA MID DIAS: 34.8 CM/S
LEFT ICA MID SYS: 81.2 CM/S
LEFT ICA PROX DIAS: 22.9 CM/S
LEFT ICA PROX SYS: 69.2 CM/S
LEFT ICA/CCA SYS: 0.93
LEFT VERTEBRAL DIAS: 16.6 CM/S
LEFT VERTEBRAL SYS: 47.8 CM/S
RIGHT CCA DIST DIAS: 30 CM/S
RIGHT CCA DIST SYS: 90.4 CM/S
RIGHT CCA PROX DIAS: 19.9 CM/S
RIGHT CCA PROX SYS: 92.2 CM/S
RIGHT ECA DIAS: 16.4 CM/S
RIGHT ECA SYS: 72.8 CM/S
RIGHT ICA DIST DIAS: 28.1 CM/S
RIGHT ICA DIST SYS: 64.1 CM/S
RIGHT ICA MID DIAS: 33.3 CM/S
RIGHT ICA MID SYS: 86.1 CM/S
RIGHT ICA PROX DIAS: 19 CM/S
RIGHT ICA PROX SYS: 50.9 CM/S
RIGHT ICA/CCA SYS: 1
RIGHT VERTEBRAL DIAS: 18.31 CM/S
RIGHT VERTEBRAL SYS: 46.2 CM/S

## 2020-10-12 PROCEDURE — 74011250636 HC RX REV CODE- 250/636: Performed by: HOSPITALIST

## 2020-10-12 PROCEDURE — 74011250637 HC RX REV CODE- 250/637: Performed by: NURSE PRACTITIONER

## 2020-10-12 PROCEDURE — 74011250637 HC RX REV CODE- 250/637: Performed by: HOSPITALIST

## 2020-10-12 PROCEDURE — 96372 THER/PROPH/DIAG INJ SC/IM: CPT

## 2020-10-12 PROCEDURE — 99218 HC RM OBSERVATION: CPT

## 2020-10-12 RX ORDER — DOCUSATE SODIUM 100 MG/1
100 CAPSULE, LIQUID FILLED ORAL DAILY
Status: DISCONTINUED | OUTPATIENT
Start: 2020-10-12 | End: 2020-10-13 | Stop reason: HOSPADM

## 2020-10-12 RX ADMIN — TRAMADOL HYDROCHLORIDE 50 MG: 50 TABLET ORAL at 10:27

## 2020-10-12 RX ADMIN — DOCUSATE SODIUM 100 MG: 100 CAPSULE, LIQUID FILLED ORAL at 11:07

## 2020-10-12 RX ADMIN — HEPARIN SODIUM 5000 UNITS: 5000 INJECTION INTRAVENOUS; SUBCUTANEOUS at 18:29

## 2020-10-12 RX ADMIN — HEPARIN SODIUM 5000 UNITS: 5000 INJECTION INTRAVENOUS; SUBCUTANEOUS at 11:07

## 2020-10-12 RX ADMIN — TRAMADOL HYDROCHLORIDE 50 MG: 50 TABLET ORAL at 20:26

## 2020-10-12 NOTE — PROGRESS NOTES
MARCELO PLAN:    RUR-Obs    Patient was admitted from home and will be discharged home in care of her family    Family will transport    Observation notice provided in writing to patient and/or caregiver as well as verbal explanation of the policy. Patients who are in outpatient status also receive the Observation notice. Reason for Admission:  Chest pain                    RUR Score: Obs                  Plan for utilizing home health:   Not indicated. PCP: First and Last name:  None. [facility]   Name of Practice:    Are you a current patient: Yes/No:    Approximate date of last visit:    Can you participate in a virtual visit with your PCP: Yes                    Current Advanced Directive/Advance Care Plan: No but interested in completing an AMD. CM notified the [facility]                         Transition of Care Plan: CM met with patient to discuss discharge planning. Patient lives with her mother and daughter in their private residence. Patient is independent without any assistive devices and gainfully employed. Patient has no PCP, CM notified CM Specialist to assist with finding a new [facility] PCP. Patient did not voice any discharge barriers. Arnaldo Johnson MSA, RN, CRM. Care Management Interventions  PCP Verified by CM: Yes  Palliative Care Criteria Met (RRAT>21 & CHF Dx)?: No  Mode of Transport at Discharge: Other (see comment)  Transition of Care Consult (CM Consult): Discharge Planning  Physical Therapy Consult: No  Occupational Therapy Consult: No  Speech Therapy Consult: No  Current Support Network:  Other(Lives with mother and daughter)  Confirm Follow Up Transport: Family  Discharge Location  Discharge Placement: Home with family assistance

## 2020-10-12 NOTE — ACP (ADVANCE CARE PLANNING)
Provided Ms Polo Jennings with a blank AMD and the booklet, \"Your Right to Decide\". Gave a brief overview of each section of the AMD with explanation of the purpose of each; answered her related questions to her stated satisfaction. Encouraged patient to discuss her wishes with whomever she decided to appoint as her primary agent to insure they would be willing to carryout her wishes. Informed her of ongoing  availability for assistance with completing an AMD. Ms Polo Jennings denied having any other concerns at that time and accepted 's offer to keep her in prayer. : Rev. Clarita Maravilla.  Linda Roth; [facility], to contact [phone] [address] call: [phone]

## 2020-10-12 NOTE — PROGRESS NOTES
Problem: Pain  Goal: *Control of Pain  Outcome: Progressing Towards Goal  Goal: *PALLIATIVE CARE:  Alleviation of Pain  Outcome: Progressing Towards Goal     Problem: Patient Education: Go to Patient Education Activity  Goal: Patient/Family Education  Outcome: Progressing Towards Goal     Problem: Falls - Risk of  Goal: *Absence of Falls  Description: Document Pavithra Clifford Fall Risk and appropriate interventions in the flowsheet.   Outcome: Progressing Towards Goal  Note: Fall Risk Interventions:            Medication Interventions: Teach patient to arise slowly                   Problem: Patient Education: Go to Patient Education Activity  Goal: Patient/Family Education  Outcome: Progressing Towards Goal     Problem: General Medical Care Plan  Goal: *Vital signs within specified parameters  Outcome: Progressing Towards Goal  Goal: *Labs within defined limits  Outcome: Progressing Towards Goal  Goal: *Absence of infection signs and symptoms  Outcome: Progressing Towards Goal  Goal: *Optimal pain control at patient's stated goal  Outcome: Progressing Towards Goal  Goal: *Skin integrity maintained  Outcome: Progressing Towards Goal  Goal: *Fluid volume balance  Outcome: Progressing Towards Goal  Goal: *Optimize nutritional status  Outcome: Progressing Towards Goal  Goal: *Anxiety reduced or absent  Outcome: Progressing Towards Goal  Goal: *Progressive mobility and function (eg: ADL's)  Outcome: Progressing Towards Goal     Problem: Patient Education: Go to Patient Education Activity  Goal: Patient/Family Education  Outcome: Progressing Towards Goal     Problem: Discharge Planning  Goal: *Discharge to safe environment  Outcome: Progressing Towards Goal

## 2020-10-12 NOTE — PROGRESS NOTES
Spiritual Care Assessment/Progress Note  [facility]      NAME: Brittny Quinn      MRN: [id number]  AGE: 41 y.o.  SEX: female  Quaker Affiliation: Anabaptism   Language: English     10/12/2020           [facility] conversation with:         [x]Patient        [] Family    [] Friend(s)        Reason for Consult: Advance medical directive consult     Spiritual beliefs: (Please include comment if needed)     [x] Identifies with a krupa tradition:         [] Supported by a krupa community:            [] Claims no spiritual orientation:           [] Seeking spiritual identity:                [] Adheres to an individual form of spirituality:           [] Not able to assess:                           Identified resources for coping:      [x] Prayer                               [] Music                  [] Guided Imagery     [] Family/friends                 [] Pet visits     [] Devotional reading                         [] Unknown     [] Other:                                               Interventions offered during this visit: (See comments for more details)    Patient Interventions: Advance medical directive consult, Affirmation of emotions/emotional suffering, Catharsis/review of pertinent events in supportive environment, Initial/Spiritual assessment, patient floor, Prayer (assurance of)           Plan of Care:     [x] Support spiritual and/or cultural needs    [x] Support AMD and/or advance care planning process      [] Support grieving process   [] Coordinate Rites and/or Rituals    [] Coordination with community clergy   [] No spiritual needs identified at this time   [] Detailed Plan of Care below (See Comments)  [] Make referral to Music Therapy  [] Make referral to Pet Therapy     [] Make referral to Addiction services  [] Make referral to [facility]  [] Make referral to Spiritual Care Partner  [] No future visits requested        [x] Follow up visits as needed     Comments:  Visited Ms Karen Carolina in room  regarding a consult for AMD information. Ms Karen Carolina was resting quietly in bed and appeared in good spirits. Provided Ms Karen Carolina with a blank AMD and the booklet, \"Your Right to Decide\". Gave a brief overview of each section of the AMD with explanation of the purpose of each; answered her related questions to her stated satisfaction. Encouraged patient to discuss her wishes with whomever she decided to appoint as her primary agent to insure they would be willing to carryout her wishes. Informed her of ongoing  availability for assistance with completing an AMD. Ms Karen Carolina denied having any other concerns at that time and accepted 's offer to keep her in prayer. : Rev. Sarbjit Crockett.  Zeyad Mahmood; [facility], to contact [phone] [address] call: [phone]

## 2020-10-12 NOTE — PROGRESS NOTES
Stress lab just called this nurse to let me know that she cannot get ms. dela cruz seen today due to a full schedule.

## 2020-10-12 NOTE — ROUTINE PROCESS
New patient Hospital follow-up PCP transitional care appointment has been scheduled with NP Ana Michelle for Oct 19 @ 2:30PM.  Pending patient discharge.   Sal Mayfield, Care Management Specialist.

## 2020-10-12 NOTE — PROGRESS NOTES
[facility] Adult  Hospitalist Group                                                                                          Hospitalist Progress Note  Shimon Joaquin NP  Answering service: [phone] OR 36 from in house phone        Date of Service:  10/12/2020  NAME:  Ernesto Bauer  :  1975  MRN:  [id number]      Admission Summary:     Per H&P: BRX: 70 y.o lady with asthma and GERD who presents with chest pain. Symptoms began earlier in the evening. She finished eating and developed sharp substernal chest pain. No exacerbating or alleviating factors. Associated symptoms include nausea, flushing, and left hand paresthesias. It lasted for about 15 minutes until EMS came and gave her nitroglycerin. She denies tobacco use, family history of heart disease. About five years ago, she had a stress test for palpitations that was reportedly normal. She then had what sounds like a loop recorder which was removed about a year ago. She is currently pain-free  Interval history / Subjective:     Seen and examined patient for follow up of chest pain. States that she is feeling ok today. Having chest tenderness with cough and deep breathing. Still having a slight headache. RN notified for need of pain medication. Has not had bowel movement in two days- wants stool softener. Per stress lab- unable to perform stress test today due to full schedule. Patient notified. Denies any syncope, dizziness, shortness of breath, nausea, vomiting or diarrhea. Assessment & Plan:     Atypical chest pain   - Trop's negative x 4   - A1c and lipid panel completed. - Stress test pending   - Cardiology following   - Continue telemetry   - Carotid duplex showing incidental finding of 2 thyroid nodules that are cystic in appearance. Discussed with patient.  To be followed up outpatient with PCP.      H/o asthma   - Stable      Code status: Full   DVT prophylaxis: SCDs    Care Plan discussed with: Patient/Family and Nurse  Anticipated Disposition: Home w/Family  Anticipated Discharge: 24 hours to 48 hours     Hospital Problems  Never Reviewed          Codes Class Noted POA    Chest pain ICD-10-CM: R07.9  ICD-9-CM: 786.50  10/10/2020 Unknown                Review of Systems:   A comprehensive review of systems was negative except for that written in the HPI. Vital Signs:    Last 24hrs VS reviewed since prior progress note. Most recent are:  Visit Vitals  /71 (BP 1 Location: Right arm, BP Patient Position: At rest)   Pulse (!) 57   Temp 98.4 °F (36.9 °C)   Resp 16   Wt 83.6 kg (184 lb 4.9 oz)   SpO2 100%   BMI 29.75 kg/m²         Intake/Output Summary (Last 24 hours) at 10/12/2020 1303  Last data filed at 10/12/2020 1034  Gross per 24 hour   Intake 340 ml   Output --   Net 340 ml        Physical Examination:             Constitutional:  No acute distress, cooperative, pleasant, answers questions    ENT:  Oral mucosa moist, oropharynx benign. Resp:  CTA bilaterally. On room air. No wheezing/rhonchi/rales. No accessory muscle use   CV:  Regular rhythm, normal rate, no murmurs, gallops, rubs. Chest tenderness with palpation     GI:  Soft, non distended, non tender. normoactive bowel sounds, no hepatosplenomegaly     Musculoskeletal:  No edema, warm, 2+ pulses throughout    Neurologic:  Moves all extremities. AAOx3, CN II-XII reviewed, follows commands      Psych:  Good insight, Not anxious nor agitated. Skin:  Good turgor, no rashes or ulcers       Data Review:    Review and/or order of clinical lab test      Labs:     Recent Labs     10/10/20  2126   WBC 11.2*   HGB 13.4   HCT 43.6        Recent Labs     10/10/20  2126      K 3.4*   *   CO2 23   BUN 13   CREA 0.80   *   CA 9.1   MG 2.1     Recent Labs     10/10/20  2126   ALT 20   AP 90   TBILI 0.3   TP 7.8   ALB 3.6   GLOB 4.2*   LPSE 67*     No results for input(s): INR, PTP, APTT, INREXT in the last 72 hours.    No results for input(s): FE, TIBC, PSAT, FERR in the last 72 hours. No results found for: FOL, RBCF   No results for input(s): PH, PCO2, PO2 in the last 72 hours.   Recent Labs     10/11/20  0651 10/11/20  0503 10/11/20  0049   TROIQ <0.05 <0.05 <0.05     Lab Results   Component Value Date/Time    Cholesterol, total 165 10/11/2020 06:51 AM    HDL Cholesterol 76 10/11/2020 06:51 AM    LDL, calculated 77.8 10/11/2020 06:51 AM    Triglyceride 56 10/11/2020 06:51 AM    CHOL/HDL Ratio 2.2 10/11/2020 06:51 AM     No results found for: [facility]  Lab Results   Component Value Date/Time    Color YELLOW/STRAW 10/11/2020 12:43 AM    Appearance CLEAR 10/11/2020 12:43 AM    Specific gravity 1.030 10/11/2020 12:43 AM    pH (UA) 5.0 10/11/2020 12:43 AM    Protein TRACE (A) 10/11/2020 12:43 AM    Glucose Negative 10/11/2020 12:43 AM    Ketone TRACE (A) 10/11/2020 12:43 AM    Bilirubin Negative 10/11/2020 12:43 AM    Urobilinogen 1.0 10/11/2020 12:43 AM    Nitrites Negative 10/11/2020 12:43 AM    Leukocyte Esterase Negative 10/11/2020 12:43 AM    Epithelial cells FEW 10/11/2020 12:43 AM    Bacteria 1+ (A) 10/11/2020 12:43 AM    WBC 5-10 10/11/2020 12:43 AM    RBC 0-5 10/11/2020 12:43 AM         Medications Reviewed:     Current Facility-Administered Medications   Medication Dose Route Frequency    docusate sodium (COLACE) capsule 100 mg  100 mg Oral DAILY    influenza vaccine 2020-21 (6 mos+)(PF) (FLUARIX/FLULAVAL/FLUZONE QUAD) injection 0.5 mL  0.5 mL IntraMUSCular PRIOR TO DISCHARGE    nitroglycerin (NITROSTAT) tablet 0.4 mg  0.4 mg SubLINGual Q5MIN PRN    sodium chloride (NS) flush 5-40 mL  5-40 mL IntraVENous Q8H    sodium chloride (NS) flush 5-40 mL  5-40 mL IntraVENous PRN    heparin (porcine) injection 5,000 Units  5,000 Units SubCUTAneous Q8H    traMADoL (ULTRAM) tablet 50 mg  50 mg Oral Q6H PRN    fentaNYL citrate (PF) injection 50 mcg  50 mcg IntraVENous Q4H PRN ______________________________________________________________________  EXPECTED LENGTH OF STAY: - - -  ACTUAL LENGTH OF STAY:          0                 Xavi Flanagan NP

## 2020-10-13 ENCOUNTER — APPOINTMENT (OUTPATIENT)
Dept: NON INVASIVE DIAGNOSTICS | Age: 45
End: 2020-10-13
Attending: INTERNAL MEDICINE
Payer: COMMERCIAL

## 2020-10-13 VITALS
WEIGHT: 177 LBS | HEART RATE: 84 BPM | RESPIRATION RATE: 18 BRPM | TEMPERATURE: 97.3 F | SYSTOLIC BLOOD PRESSURE: 115 MMHG | DIASTOLIC BLOOD PRESSURE: 83 MMHG | OXYGEN SATURATION: 98 % | BODY MASS INDEX: 28.45 KG/M2 | HEIGHT: 66 IN

## 2020-10-13 LAB
ANION GAP SERPL CALC-SCNC: 5 MMOL/L (ref 5–15)
BASOPHILS # BLD: 0 K/UL (ref 0–0.1)
BASOPHILS NFR BLD: 0 % (ref 0–1)
BUN SERPL-MCNC: 13 MG/DL (ref 6–20)
BUN/CREAT SERPL: 18 (ref 12–20)
CALCIUM SERPL-MCNC: 9 MG/DL (ref 8.5–10.1)
CHLORIDE SERPL-SCNC: 107 MMOL/L (ref 97–108)
CO2 SERPL-SCNC: 19 MMOL/L (ref 21–32)
CREAT SERPL-MCNC: 0.73 MG/DL (ref 0.55–1.02)
DIFFERENTIAL METHOD BLD: ABNORMAL
EOSINOPHIL # BLD: 0.1 K/UL (ref 0–0.4)
EOSINOPHIL NFR BLD: 2 % (ref 0–7)
ERYTHROCYTE [DISTWIDTH] IN BLOOD BY AUTOMATED COUNT: 15.3 % (ref 11.5–14.5)
GLUCOSE SERPL-MCNC: 80 MG/DL (ref 65–100)
HCT VFR BLD AUTO: 44.7 % (ref 35–47)
HGB BLD-MCNC: 13.5 G/DL (ref 11.5–16)
IMM GRANULOCYTES # BLD AUTO: 0 K/UL (ref 0–0.04)
IMM GRANULOCYTES NFR BLD AUTO: 0 % (ref 0–0.5)
LYMPHOCYTES # BLD: 2.8 K/UL (ref 0.8–3.5)
LYMPHOCYTES NFR BLD: 33 % (ref 12–49)
MCH RBC QN AUTO: 24.3 PG (ref 26–34)
MCHC RBC AUTO-ENTMCNC: 30.2 G/DL (ref 30–36.5)
MCV RBC AUTO: 80.4 FL (ref 80–99)
MONOCYTES # BLD: 0.9 K/UL (ref 0–1)
MONOCYTES NFR BLD: 10 % (ref 5–13)
NEUTS SEG # BLD: 4.8 K/UL (ref 1.8–8)
NEUTS SEG NFR BLD: 55 % (ref 32–75)
NRBC # BLD: 0 K/UL (ref 0–0.01)
NRBC BLD-RTO: 0 PER 100 WBC
PLATELET # BLD AUTO: 267 K/UL (ref 150–400)
PMV BLD AUTO: 10.9 FL (ref 8.9–12.9)
POTASSIUM SERPL-SCNC: 6.1 MMOL/L (ref 3.5–5.1)
RBC # BLD AUTO: 5.56 M/UL (ref 3.8–5.2)
SODIUM SERPL-SCNC: 131 MMOL/L (ref 136–145)
STRESS ANGINA INDEX: 0
STRESS BASELINE HR: 58 BPM
STRESS ESTIMATED WORKLOAD: 4.6 METS
STRESS EXERCISE DUR MIN: NORMAL
STRESS PEAK DIAS BP: 90 MMHG
STRESS PEAK SYS BP: 180 MMHG
STRESS PERCENT HR ACHIEVED: 96 %
STRESS POST PEAK HR: 169 BPM
STRESS RATE PRESSURE PRODUCT: NORMAL BPM*MMHG
STRESS ST DEPRESSION: 0 MM
STRESS ST ELEVATION: 0 MM
STRESS TARGET HR: 176 BPM
WBC # BLD AUTO: 8.7 K/UL (ref 3.6–11)

## 2020-10-13 PROCEDURE — 74011250636 HC RX REV CODE- 250/636: Performed by: HOSPITALIST

## 2020-10-13 PROCEDURE — 93320 DOPPLER ECHO COMPLETE: CPT

## 2020-10-13 PROCEDURE — 90471 IMMUNIZATION ADMIN: CPT

## 2020-10-13 PROCEDURE — 74011250636 HC RX REV CODE- 250/636: Performed by: INTERNAL MEDICINE

## 2020-10-13 PROCEDURE — 85025 COMPLETE CBC W/AUTO DIFF WBC: CPT

## 2020-10-13 PROCEDURE — 80048 BASIC METABOLIC PNL TOTAL CA: CPT

## 2020-10-13 PROCEDURE — 96372 THER/PROPH/DIAG INJ SC/IM: CPT

## 2020-10-13 PROCEDURE — 74011250637 HC RX REV CODE- 250/637: Performed by: NURSE PRACTITIONER

## 2020-10-13 PROCEDURE — 36415 COLL VENOUS BLD VENIPUNCTURE: CPT

## 2020-10-13 PROCEDURE — 99218 HC RM OBSERVATION: CPT

## 2020-10-13 PROCEDURE — 90686 IIV4 VACC NO PRSV 0.5 ML IM: CPT | Performed by: INTERNAL MEDICINE

## 2020-10-13 RX ADMIN — DOCUSATE SODIUM 100 MG: 100 CAPSULE, LIQUID FILLED ORAL at 09:52

## 2020-10-13 RX ADMIN — INFLUENZA VIRUS VACCINE 0.5 ML: 15; 15; 15; 15 SUSPENSION INTRAMUSCULAR at 15:08

## 2020-10-13 RX ADMIN — HEPARIN SODIUM 5000 UNITS: 5000 INJECTION INTRAVENOUS; SUBCUTANEOUS at 02:26

## 2020-10-13 NOTE — PROGRESS NOTES
Problem: Pain  Goal: *Control of Pain  Outcome: Resolved/Met  Goal: *PALLIATIVE CARE:  Alleviation of Pain  Outcome: Resolved/Met     Problem: Patient Education: Go to Patient Education Activity  Goal: Patient/Family Education  Outcome: Resolved/Met     Problem: Falls - Risk of  Goal: *Absence of Falls  Description: Document Joes Fall Risk and appropriate interventions in the flowsheet.   Outcome: Resolved/Met     Problem: Patient Education: Go to Patient Education Activity  Goal: Patient/Family Education  Outcome: Resolved/Met     Problem: General Medical Care Plan  Goal: *Vital signs within specified parameters  Outcome: Resolved/Met  Goal: *Labs within defined limits  Outcome: Resolved/Met  Goal: *Absence of infection signs and symptoms  Outcome: Resolved/Met  Goal: *Optimal pain control at patient's stated goal  Outcome: Resolved/Met  Goal: *Skin integrity maintained  Outcome: Resolved/Met  Goal: *Fluid volume balance  Outcome: Resolved/Met  Goal: *Optimize nutritional status  Outcome: Resolved/Met  Goal: *Anxiety reduced or absent  Outcome: Resolved/Met  Goal: *Progressive mobility and function (eg: ADL's)  Outcome: Resolved/Met     Problem: Patient Education: Go to Patient Education Activity  Goal: Patient/Family Education  Outcome: Resolved/Met     Problem: Discharge Planning  Goal: *Discharge to safe environment  Outcome: Resolved/Met

## 2020-10-13 NOTE — PROGRESS NOTES
Problem: Pain  Goal: *Control of Pain  Outcome: Progressing Towards Goal     Problem: Falls - Risk of  Goal: *Absence of Falls  Description: Document Jose Fall Risk and appropriate interventions in the flowsheet.   Outcome: Progressing Towards Goal  Note: Fall Risk Interventions:            Medication Interventions: Patient to call before getting OOB, Teach patient to arise slowly                   Problem: General Medical Care Plan  Goal: *Vital signs within specified parameters  Outcome: Progressing Towards Goal  Goal: *Labs within defined limits  Outcome: Progressing Towards Goal  Goal: *Absence of infection signs and symptoms  Outcome: Progressing Towards Goal  Goal: *Optimal pain control at patient's stated goal  Outcome: Progressing Towards Goal  Goal: *Skin integrity maintained  Outcome: Progressing Towards Goal  Goal: *Fluid volume balance  Outcome: Progressing Towards Goal  Goal: *Optimize nutritional status  Outcome: Progressing Towards Goal  Goal: *Anxiety reduced or absent  Outcome: Progressing Towards Goal

## 2020-10-13 NOTE — ROUTINE PROCESS
Patients 20g IV in the right forearm infiltrated. This RN and Saw Monreal RN from [address] [place] [address] Elisa Sadler RN from CCU attempted to place IVs and were unsuccessful. Per Yves Vasquez NP I reached out to anesthesia for an IV placement for patients stress test 10/13 am.   Per anesthesia the cath lab can attempt first and reach out to them if unable.

## 2020-10-13 NOTE — PROGRESS NOTES
[address] 35 October 13, 2020       RE: Prakash Christian      To Whom It May Concern,    This is to certify that Prakash Christian has been under our care from 10/10/2020 to 10/13/2020. She may return to work on 10/16/2020    Please feel free to contact my office if you have any questions or concerns. Thank you for your assistance in this matter.       Sincerely,  Dave Solano NP

## 2020-10-13 NOTE — DISCHARGE INSTRUCTIONS
Discharge Instructions       PATIENT ID: Meghana Swartz  MRN: [id number]   YOB: 1975    DATE OF ADMISSION: 10/10/2020  9:07 PM    DATE OF DISCHARGE: 10/13/2020    PRIMARY CARE PROVIDER: None     ATTENDING PHYSICIAN: Jonna Hernandes MD  DISCHARGING PROVIDER: Alexandro Wright NP    To contact this individual call [phone] and ask the  to page. If unavailable ask to be transferred the Adult Hospitalist Department. DISCHARGE DIAGNOSES ***    CONSULTATIONS: IP CONSULT TO CARDIOLOGY    PROCEDURES/SURGERIES: * No surgery found *    PENDING TEST RESULTS:   At the time of discharge the following test results are still pending: Chest pain, resolved    FOLLOW UP APPOINTMENTS:   Follow-up Information     Follow up With Specialties Details Why Contact Bridget Gonzalez NP Internal Medicine On 10/19/2020 New patient hospital follow up appt has been scheduled for Oct 19 @ 2:30PM [facility]  [phone]             ADDITIONAL CARE RECOMMENDATIONS:   Take medications as prescribed. You may take Acetaminophen or Ibuprofen for pain. Follow up with your primary care provider. 2 thyroid nodules that are cystic in appearance was found on carotid duplex. Your primary care provider will order further work-up if needed. Keep well hydrated. DIET: Resume previous diet      ACTIVITY: Activity as tolerated    WOUND CARE: None     EQUIPMENT needed: None       DISCHARGE MEDICATIONS:   See Medication Reconciliation Form    · It is important that you take the medication exactly as they are prescribed. · Keep your medication in the bottles provided by the pharmacist and keep a list of the medication names, dosages, and times to be taken in your wallet. · Do not take other medications without consulting your doctor. NOTIFY YOUR PHYSICIAN FOR ANY OF THE FOLLOWING:   Fever over 101 degrees for 24 hours.    Chest pain, shortness of breath, fever, chills, nausea, vomiting, diarrhea, change in mentation, falling, weakness, bleeding. Severe pain or pain not relieved by medications. Or, any other signs or symptoms that you may have questions about.       DISPOSITION:  X Home With:   OT  PT  ANGELINA  RN       SNF/Inpatient Rehab/LTAC    Independent/assisted living    Hospice    Other:         Signed:   Phain Foreman NP  10/13/2020  2:05 PM

## 2020-10-13 NOTE — ROUTINE PROCESS
Bedside and Verbal shift change report given to Mark (oncoming nurse) by Daniel Connolly (offgoing nurse). Report included the following information SBAR, Kardex, Intake/Output, MAR, Recent Results and Cardiac Rhythm NSR.

## 2020-10-13 NOTE — DISCHARGE SUMMARY
Discharge Summary       PATIENT ID: Omega Pichardo  MRN: [id number]   YOB: 1975    DATE OF ADMISSION: 10/10/2020  9:07 PM    DATE OF DISCHARGE: 10/13/2020  PRIMARY CARE PROVIDER: None     ATTENDING PHYSICIAN: David Savage MD   DISCHARGING PROVIDER: Han Gresham NP    To contact this individual call [phone] and ask the  to page. If unavailable ask to be transferred the Adult Hospitalist Department. CONSULTATIONS: IP CONSULT TO CARDIOLOGY    PROCEDURES/SURGERIES: * No surgery found *    ADMITTING DIAGNOSES & HOSPITAL COURSE:   Per H&P: HPI: 45 y.o lady with asthma and GERD who presents with chest pain. Symptoms began earlier in the evening. She finished eating and developed sharp substernal chest pain. No exacerbating or alleviating factors. Associated symptoms include nausea, flushing, and left hand paresthesias. It lasted for about 15 minutes until EMS came and gave her nitroglycerin. She denies tobacco use, family history of heart disease. About five years ago, she had a stress test for palpitations that was reportedly normal. She then had what sounds like a loop recorder which was removed about a year ago. She is currently pain-free        DISCHARGE DIAGNOSES / PLAN:      Atypical chest pain   - Trop's negative x 4   - Stress test negative on 10/13/20   - Carotid duplex showing incidental finding of 2 thyroid nodules that are cystic in appearance.  Discussed with patient. To be followed up outpatient with PCP.   - Follow up with PCP  - Take Acetaminophen or Ibuprofen for pain      H/o asthma   Stable     Sick leave for 3 days given. ADDITIONAL CARE RECOMMENDATIONS:     Take medications as prescribed. You may take Acetaminophen or Ibuprofen for pain. Follow up with your primary care provider. 2 thyroid nodules that are cystic in appearance was found on carotid duplex. Your primary care provider will order further work-up if needed.      Keep well hydrated  PENDING TEST RESULTS:   At the time of discharge the following test results are still pending: None     FOLLOW UP APPOINTMENTS:    Follow-up Information     Follow up With Specialties Details Why Contact Bridget Rocha NP Internal Medicine On 10/19/2020 New patient hospital follow up appt has been scheduled for Oct 19 @ 2:30PM [facility]  [phone]               DIET: Resume previous diet      ACTIVITY: Activity as tolerated    WOUND CARE: None     EQUIPMENT needed: None       DISCHARGE MEDICATIONS:  Current Discharge Medication List      CONTINUE these medications which have NOT CHANGED    Details   albuterol (PROVENTIL HFA, VENTOLIN HFA) 90 mcg/actuation inhaler Take 2 Puffs by inhalation every four (4) hours as needed. Indications: BRONCHOSPASM PREVENTION         STOP taking these medications       cephALEXin (KEFLEX) 500 mg capsule Comments:   Reason for Stopping:                 NOTIFY YOUR PHYSICIAN FOR ANY OF THE FOLLOWING:   Fever over 101 degrees for 24 hours. Chest pain, shortness of breath, fever, chills, nausea, vomiting, diarrhea, change in mentation, falling, weakness, bleeding. Severe pain or pain not relieved by medications. Or, any other signs or symptoms that you may have questions about. DISPOSITION:  X  Home With:   OT  PT  HH  RN       Long term SNF/Inpatient Rehab    Independent/assisted living    Hospice    Other:       PATIENT CONDITION AT DISCHARGE:     Functional status    Poor     Deconditioned    X Independent      Cognition    X Lucid     Forgetful     Dementia      Catheters/lines (plus indication)    [place]     PICC     PEG    X None      Code status    X Full code     DNR      PHYSICAL EXAMINATION AT DISCHARGE:  General:          Alert, cooperative, no distress, appears stated age.      HEENT:           Atraumatic, anicteric sclerae, pink conjunctivae                          No oral ulcers, mucosa moist, throat clear, dentition fair  Neck:               Supple, symmetrical  Lungs:             Clear to auscultation bilaterally. No Wheezing or Rhonchi. No rales. Chest wall:      No tenderness  No Accessory muscle use. Heart:              Regular  rhythm,  No  murmur   No edema  Abdomen:        Soft, non-tender. Not distended. Bowel sounds normal  Extremities:     No cyanosis. No clubbing,                            Skin turgor normal, Capillary refill normal  Skin:                Not pale. Not Jaundiced  No rashes   Psych:             Not anxious or agitated.   Neurologic:      Alert, moves all extremities, answers questions appropriately and responds to commands       CHRONIC MEDICAL DIAGNOSES:  Problem List as of 10/13/2020 Never Reviewed          Codes Class Noted - Resolved    Chest pain ICD-10-CM: R07.9  ICD-9-CM: 786.50  10/10/2020 - Present              Greater than 40 minutes were spent with the patient on counseling and coordination of care    Signed:   Shimon Joaquin NP  10/13/2020  2:16 PM

## 2020-10-19 ENCOUNTER — OFFICE VISIT (OUTPATIENT)
Dept: INTERNAL MEDICINE CLINIC | Age: 45
End: 2020-10-19
Payer: COMMERCIAL

## 2020-10-19 VITALS
RESPIRATION RATE: 18 BRPM | HEART RATE: 83 BPM | OXYGEN SATURATION: 99 % | DIASTOLIC BLOOD PRESSURE: 78 MMHG | SYSTOLIC BLOOD PRESSURE: 122 MMHG | TEMPERATURE: 98.8 F | BODY MASS INDEX: 30.15 KG/M2 | HEIGHT: 66 IN | WEIGHT: 187.6 LBS

## 2020-10-19 DIAGNOSIS — E04.1 RIGHT THYROID NODULE: Primary | ICD-10-CM

## 2020-10-19 PROCEDURE — 99203 OFFICE O/P NEW LOW 30 MIN: CPT | Performed by: NURSE PRACTITIONER

## 2020-10-19 PROCEDURE — 84443 ASSAY THYROID STIM HORMONE: CPT | Performed by: NURSE PRACTITIONER

## 2020-10-19 NOTE — PROGRESS NOTES
Establish Care (new patient) and Transitions Of Care (dx chest pain)       HPI:     Violet Ashton is a 41y.o. year old female who is here to establish as a new patient, and for follow-up for cysts found on right thyroid gland while having ultrasound done in hospital.    Patient states he was hospitalized approximately 4 days ago with sudden onset of left-sided weakness and some chest pain. She is admitted through the ER, and had several labs and exams done while inpatient including a set stress test and an ultrasound of her carotid arteries. During that time, the patient states she was told she had 2 cysts on her right thyroid. She is here for follow-up. She denies any issues at this time or sequelae from her hospitalization. Patient is /Ab2. Visit Vitals  /78 (BP 1 Location: Left arm, BP Patient Position: Sitting)   Pulse 83   Temp 98.8 °F (37.1 °C) (Oral)   Resp 18   Ht 5' 6\" (1.676 m)   Wt 187 lb 9.6 oz (85.1 kg)   SpO2 99%   BMI 30.28 kg/m²       Past Medical History:   Diagnosis Date    Abnormal Pap smear         Asthma     Asthma     GERD (gastroesophageal reflux disease)        Past Surgical History:   Procedure Laterality Date    HX OTHER SURGICAL      d&c        Prior to Admission medications    Medication Sig Start Date End Date Taking? Authorizing Provider   albuterol (PROVENTIL HFA, VENTOLIN HFA) 90 mcg/actuation inhaler Take 2 Puffs by inhalation every four (4) hours as needed.     Indications: BRONCHOSPASM PREVENTION   Yes Provider, Historical        No Known Allergies     Social History     Socioeconomic History    Marital status:      Spouse name: Not on file    Number of children: Not on file    Years of education: Not on file    Highest education level: Not on file   Occupational History    Not on file   Social Needs    Financial resource strain: Not on file    Food insecurity     Worry: Not on file     Inability: Not on file   [place] Transportation needs     Medical: Not on file     Non-medical: Not on file   Tobacco Use    Smoking status: Never Smoker    Smokeless tobacco: Never Used   Substance and Sexual Activity    Alcohol use: No    Drug use: No    Sexual activity: Yes     Partners: Male     Birth control/protection: None   Lifestyle    Physical activity     Days per week: Not on file     Minutes per session: Not on file    Stress: Not on file   Relationships    Social connections     Talks on phone: Not on file     Gets together: Not on file     Attends [employer] service: Not on file     Active member of club or organization: Not on file     Attends meetings of clubs or organizations: Not on file     Relationship status: Not on file    Intimate partner violence     Fear of current or ex partner: Not on file     Emotionally abused: Not on file     Physically abused: Not on file     Forced sexual activity: Not on file   Other Topics Concern    Not on file   Social History Narrative    Not on file        ROS:     Constitutional: She denies fevers, weight loss, night sweats. Eyes: No blurred/double vision or photophobia. ENT: No difficulty with swallowing, taste, speech or smell. Respiratory: No cough, wheezing, or shortness of breath. Cardiovascular: Denies chest pain, palpitations, unexplained indigestion or syncope episodes. Neurological:  No numbness, tingling, burning paresthesias or loss of motor strength. No syncope episodes, dizziness or new headaches. Skin:  No recent rashes, bruising, or mole changes. Hematologic: No easy bruising or bleeding gums  Lymphatic: No lymph node enlargement or night sweats  Endocrine: No increased urination, increased hunger, or increased thirst. No rapid weight change and no night sweats, hot/cold intolerance.       Physical Examination:     Vitals:    10/19/20 1427   BP: 122/78   Pulse: 83   Resp: 18   Temp: 98.8 °F (37.1 °C)   TempSrc: Oral   SpO2: 99%   Weight: 187 lb 9.6 oz (85.1 kg) Height: 5' 6\" (1.676 m)   PainSc:   2   PainLoc: Throat        General appearance - Alert, well appearing, and in no distress  Mental status - Alert, oriented to person, place, and time  HEENT:  Ears - Bilateral TM's and external ear canals clear, cone of light present and normal. No perforations. Eyes - Pupillary responses normal.  Extraocular muscle function intact. Lids and conjunctiva not injected or icteric. Pharynx- Moist, pink without lesions. Teeth intact and in good repair without signs of caries. Neck - Supple without thyromegaly or bruit. No JVD noted. Unable to palpate nodules or cysts. Lungs - Clear to auscultation and percussion. No crackles, wheezes, or abnormal breath sounds present. Cardiac- Normal rate, regular rhythm without murmur. PMI not displaced. No rub or gallop noted. Lymphatics - No palpable lymphadenopathy  Skin - No rashes, bruises, or unusual mole change noted  Neurological - Cranial nerves II-XII grossly intact. Motor strength 5/5. Station and gait normal  Musculoskeletal - No joint tenderness, deformity or swelling noted. Psychiatric -patient speech has normal rate and rhythm and responds to questions appropriately. The patient's affect is appropriate and mood is appropriate. Memory is intact to immediate, recent and remote. Patient's thought content and thought processing appear to be within normal limits. Assessment/Plan:     I will do a baseline TSH on the patient today, and send her for further evaluation for fine-needle biopsy of her right thyroid gland with focus to her cysts. I have discussed the risks, benefits, and options with the patient today. Patient states understanding and agrees with plan. Patient to follow-up with me in approximately 2 months for general physical and well woman exam.    I have reviewed the patient's medical history in detail and updated the computerized patient record.      We had a prolonged discussion about these complex clinical issues and went over the various important aspects to consider. All questions were answered. Advised her to call back or return to office if symptoms do not improve, change in nature, or persist.    She was given an after visit summary or informed of [place] [place] [facility] which includes patient instructions, diagnoses, current medications, & vitals. She expressed understanding with the diagnosis and plan.

## 2020-10-19 NOTE — PROGRESS NOTES
HIPAA verified by two patient identifiers. Jase Norman is a 41 y.o. female    Chief Complaint   Patient presents with   [address]     new patient    Transitions Of Care     dx chest pain       Visit Vitals  /78 (BP 1 Location: Left arm, BP Patient Position: Sitting)   Pulse 83   Temp 98.8 °F (37.1 °C) (Oral)   Resp 18   Ht 5' 6\" (1.676 m)   Wt 187 lb 9.6 oz (85.1 kg)   SpO2 99%   BMI 30.28 kg/m²       Pain Scale: 2/10  Pain Location: Throat      Health Maintenance Due   Topic Date Due    PAP AKA CERVICAL CYTOLOGY  08/10/1997         Coordination of Care Questionnaire:  :   1) Have you been to an emergency room, urgent care, or hospitalized since your last visit? If yes, where when, and reason for visit? yes  10/10/2020/to 10/13/2020 chest pain  At [facility]      2. Have seen or consulted any other health care provider since your last visit? If yes, where when, and reason for visit? NO      Patient is accompanied by self I have received verbal consent from Jase Norman to discuss any/all medical information while they are present in the room.

## 2020-10-20 ENCOUNTER — TELEPHONE (OUTPATIENT)
Dept: INTERNAL MEDICINE CLINIC | Age: 45
End: 2020-10-20

## 2020-10-20 LAB — TSH SERPL DL<=0.05 MIU/L-ACNC: 0.59 UIU/ML (ref 0.34–5.6)

## 2020-10-20 NOTE — TELEPHONE ENCOUNTER
Brittny Quinn is a 41 y.o. female  HIPAA verified by two patient identifiers. Thyroid function normal.  We will arrange for fine-needle biopsy of cysts as discussed. Spoke to patient ,advised her of the results. Pt verbalized understanding of information discussed w/ no further questions at this time.

## 2020-10-21 ENCOUNTER — TELEPHONE (OUTPATIENT)
Dept: INTERNAL MEDICINE CLINIC | Age: 45
End: 2020-10-21

## 2020-10-21 DIAGNOSIS — E04.1 RIGHT THYROID NODULE: Primary | ICD-10-CM

## 2020-10-21 NOTE — TELEPHONE ENCOUNTER
Pt called stating that for the referral to be done she needs an ultrasound first. Could you please order one? And if you do could it be at [facility]. Please advise and if you have any questions please call pt.  The order is the [facility] DINH COPELAND ASP Luis Ybarra [[id number]]\"

## 2020-10-22 ENCOUNTER — HOSPITAL ENCOUNTER (OUTPATIENT)
Dept: ULTRASOUND IMAGING | Age: 45
Discharge: HOME | End: 2020-10-22
Attending: NURSE PRACTITIONER
Payer: COMMERCIAL

## 2020-10-22 DIAGNOSIS — E04.1 RIGHT THYROID NODULE: ICD-10-CM

## 2020-10-22 PROCEDURE — 76536 US EXAM OF HEAD AND NECK: CPT

## 2020-10-23 NOTE — PROGRESS NOTES
Patient shows to have complex cyst right lobe and some nodules as well. We will arrange for fine-needle biopsy now.

## 2020-10-27 ENCOUNTER — HOSPITAL ENCOUNTER (OUTPATIENT)
Dept: ULTRASOUND IMAGING | Age: 45
Discharge: HOME | End: 2020-10-27
Attending: NURSE PRACTITIONER
Payer: COMMERCIAL

## 2020-10-27 DIAGNOSIS — E04.1 RIGHT THYROID NODULE: ICD-10-CM

## 2020-10-27 PROCEDURE — 2709999900 HC NON-CHARGEABLE SUPPLY

## 2020-10-27 PROCEDURE — 10005 FNA BX W/US GDN 1ST LES: CPT

## 2020-10-27 PROCEDURE — 77030014115

## 2020-10-27 PROCEDURE — 88173 CYTOPATH EVAL FNA REPORT: CPT

## 2020-10-27 PROCEDURE — 88172 CYTP DX EVAL FNA 1ST EA SITE: CPT

## 2020-10-27 RX ORDER — LIDOCAINE HYDROCHLORIDE 10 MG/ML
4 INJECTION, SOLUTION EPIDURAL; INFILTRATION; INTRACAUDAL; PERINEURAL
Status: COMPLETED | OUTPATIENT
Start: 2020-10-27 | End: 2020-10-27

## 2020-10-27 RX ADMIN — LIDOCAINE HYDROCHLORIDE 5 ML: 10 INJECTION, SOLUTION EPIDURAL; INFILTRATION; INTRACAUDAL; PERINEURAL at 09:20

## 2020-10-27 NOTE — DISCHARGE INSTRUCTIONS
BIOPSY DISCHARGE INSTRUCTIONS for: Candy Mcknight on Tuesday October 27, 2020    General Instructions:  - A biopsy is the removal of a small piece of tissue for microscopic examination or testing.  - Healthy tissue can be obtained for the purpose of tissue-type matching for transplants. - Unhealthy tissues are more commonly biopsied to diagnose disease. General Biopsy:  - A mass can grow in any area of the body, and we are taking a specimen as ordered by your doctor. - The risks are the same. They include bleeding, pain, and infection. Home Care Instructions:  - You may resume your regular diet and medication regimen. - You may use over the counter acetaminophen (Tylenol) or ibuprofen (Advil) for the soreness. - You may apply an ice pack to the affected area for 20-30 minutes at time for the first 24 hours. -- After that, you may apply a heat pack. - You may remove the bandaid(s) tonight. - You may take a shower tonight. - Please keep the site clean and dry for 24-48 hours. - Do not soak in any kind of water (bath tub, hot tub, pool, ocean, etc) for 24-48 hours. - Do not participate in any kind of activity making you vigorously sweat for 24-48 hours. - Do not use any moisturizer/makeup/perfume on the site for 24-48 hours. Call If:  - You should call your doctor if you have any questions or concerns about the biopsy site. - Call if you should have increased pain, fever, redness, drainage, or bleeding more than a small spot on the bandage.       Follow up with your doctor as requested.        _____________________________   _____________________________  Patient Signature      Technologist Signature

## 2020-10-28 NOTE — PROGRESS NOTES
Thank you for completing the fine-needle biopsy of the thyroid cyst.  I will await the pathology report and go from there.

## 2020-10-29 NOTE — PROGRESS NOTES
Patient viewed results and provider comments via [address] Patient Result Comments     Viewed by Chari Dancer on 10/28/2020  4:52 PM   Written by Rachel Atkins NP on 10/28/2020  8:01 AM   Thank you for completing the fine-needle biopsy of the thyroid cyst.  I will await the pathology report and go from there.

## 2020-11-20 ENCOUNTER — OFFICE VISIT (OUTPATIENT)
Dept: INTERNAL MEDICINE CLINIC | Age: 45
End: 2020-11-20
Payer: COMMERCIAL

## 2020-11-20 VITALS
HEIGHT: 66 IN | RESPIRATION RATE: 14 BRPM | BODY MASS INDEX: 30.37 KG/M2 | TEMPERATURE: 97.5 F | DIASTOLIC BLOOD PRESSURE: 80 MMHG | HEART RATE: 82 BPM | SYSTOLIC BLOOD PRESSURE: 126 MMHG | WEIGHT: 189 LBS | OXYGEN SATURATION: 98 %

## 2020-11-20 DIAGNOSIS — E04.9 THYROID ENLARGED: ICD-10-CM

## 2020-11-20 DIAGNOSIS — R05.9 COUGH: ICD-10-CM

## 2020-11-20 DIAGNOSIS — R93.89 ABNORMAL THYROID ULTRASOUND: ICD-10-CM

## 2020-11-20 DIAGNOSIS — R89.9 ABNORMAL THYROID BIOPSY: Primary | ICD-10-CM

## 2020-11-20 PROCEDURE — 99213 OFFICE O/P EST LOW 20 MIN: CPT | Performed by: NURSE PRACTITIONER

## 2020-11-20 NOTE — PROGRESS NOTES
Yane Be is a 41 y.o. female presenting for Cough  . 1. Have you been to the ER, urgent care clinic since your last visit? Hospitalized since your last visit? No    2. Have you seen or consulted any other health care providers outside of the [address] [place] [address] since your last visit? Include any pap smears or colon screening. No    No flowsheet data found. Abuse Screening Questionnaire 10/19/2020   Do you ever feel afraid of your partner? N   Are you in a relationship with someone who physically or mentally threatens you? N   Is it safe for you to go home? Y       3 most recent PHQ Screens 10/19/2020   Little interest or pleasure in doing things Not at all   Feeling down, depressed, irritable, or hopeless Not at all   Total Score PHQ 2 0       There are no discontinued medications.

## 2020-11-20 NOTE — PROGRESS NOTES
Chief Complaint   Patient presents with    Cough       SUBJECTIVE:    Fahad Robles is a 41 y.o. female who is here today for a follow up appointment regarding her fine-needle biopsy of thyroid gland that was performed on 10/27/2020. The patient states that the fluid was \"all aspirated\" from the cyst in her right side thyroid gland, and after which she had noticed she had stopped coughing. Her coughing then resumed on 11/17/2020. She denies any choking, excess swelling, or drooling. She denies any tenderness to the fine-needle biopsy site. She states she is otherwise been okay. She is also here to discuss the results of her thyroid biopsy. Current Outpatient Medications   Medication Sig Dispense Refill    albuterol (PROVENTIL HFA, VENTOLIN HFA) 90 mcg/actuation inhaler Take 2 Puffs by inhalation every four (4) hours as needed. Indications: BRONCHOSPASM PREVENTION       Past Medical History:   Diagnosis Date    Abnormal Pap smear     2008    Asthma     Asthma     GERD (gastroesophageal reflux disease)      Past Surgical History:   Procedure Laterality Date    HX OTHER SURGICAL      d&c 2005     No Known Allergies    REVIEW OF SYSTEMS:  General: She denies any chills, fever, weight loss or gain, appetite or sleeping habits. ENT: She denies any acute headaches, nasal congestion, nosebleeds, or tinnitus  Neck: No stiffness, pain, or swollen nodes  Respiratory: Positive for occasional dry cough. Negative for - hemoptysis, shortness of breath, or wheezing  Cardiovascular : Negative for - acute chest pain, orthopnea, edema, palpitations, or shortness of breath  Lymphatic: Negative for swollen glands/nodes  Integumentary: Denies any new rash or unexplained bruising  Endocrine: Denies malaise/lethargy, hot/cold intolerance, polyuria/polydipsia, or unexpected weight changes. Psychiatric: Denies any acute depression, anxiety, or psychosis.          Social History     Socioeconomic History    Marital status:      Spouse name: Not on file    Number of children: Not on file    Years of education: Not on file    Highest education level: Not on file   Tobacco Use    Smoking status: Never Smoker    Smokeless tobacco: Never Used   Substance and Sexual Activity    Alcohol use: No    Drug use: No    Sexual activity: Yes     Partners: Male     Birth control/protection: None     Family History   Problem Relation Age of Onset    Cancer Father     Stroke Maternal Grandfather     Hypertension Maternal Grandfather     Diabetes Paternal Grandmother     Hypertension Paternal Grandmother        OBJECTIVE:     Visit Vitals  /80 (BP 1 Location: Left arm, BP Patient Position: Sitting)   Pulse 82   Temp 97.5 °F (36.4 °C)   Resp 14   Ht 5' 6\" (1.676 m)   Wt 189 lb (85.7 kg)   SpO2 98%   BMI 30.51 kg/m²       Constitutional: She appears well nourished, of stated age, and dressed appropriately. Eyes: Sclera anicteric, PERRLA, EOMI  Neck: There is mild bilateral swelling of her thyroid gland. Otherwise, supple without lymphadenopathy. Respiratory: Clear to ascultation X5, normal inspiratory effort, no adventitious breath sounds. Cardiovascular: Regular rate and rhythm, no murmurs, rubs or gallops, PMI not displaced, No thrills, no peripheral edema  Lymphatic: No lymph node enlargemant. Integumentary: No unusual rashes or suspicious skin lesions noted. Nails appear normal.  Neuro: Non-focal exam, A & O X 3, DTRs equal and adequate. Psychiatric: Appropriate affect and demeanor, pleasant and cooperative. Patient's thought content and thought processing appear to be within normal limits. ASSESSMENT/PLAN:       ICD-10-CM ICD-9-CM    1. Abnormal thyroid biopsy  R89.9 796.9 REFERRAL TO ENT-OTOLARYNGOLOGY   2. Abnormal thyroid ultrasound  R93.89 794.5 REFERRAL TO ENT-OTOLARYNGOLOGY   3. Thyroid enlarged  E04.9 240.9    4.  Cough  R05 786.2          Orders Placed This Encounter    REFERRAL TO ENT-OTOLARYNGOLOGY     1: Patient had abnormal Hurthle cells on biopsy. After discussion, patient elects to see ENT for further evaluation. 2: Patient to continue all current medications as directed. 3: Patient to follow-up with me in approximately 3 months or sooner as needed. Patient states understanding and agrees with plan. ATTENTION:   This medical record was transcribed using an electronic medical records system. Although proofread, it may and can contain electronic and spelling errors. Other human spelling and other errors may be present. Corrections may be executed at a later time. Please feel free to contact us for any clarifications as needed. Follow-up and Dispositions    · Return in about 3 months (around 2/20/2021) for Follow up. No results found for any visits on 11/20/20. Signed,  Jane Freeman NP    The patient verbalized understanding of the problems and plans as explained.

## 2021-02-17 DIAGNOSIS — R05.9 COUGH: Primary | ICD-10-CM

## 2021-02-17 RX ORDER — BENZONATATE 100 MG/1
100 CAPSULE ORAL
Qty: 30 CAP | Refills: 0 | Status: SHIPPED | OUTPATIENT
Start: 2021-02-17 | End: 2022-09-08

## 2021-02-19 ENCOUNTER — OFFICE VISIT (OUTPATIENT)
Dept: INTERNAL MEDICINE CLINIC | Age: 46
End: 2021-02-19
Payer: COMMERCIAL

## 2021-02-19 VITALS
BODY MASS INDEX: 31.27 KG/M2 | TEMPERATURE: 98.6 F | WEIGHT: 194.6 LBS | RESPIRATION RATE: 16 BRPM | DIASTOLIC BLOOD PRESSURE: 82 MMHG | SYSTOLIC BLOOD PRESSURE: 127 MMHG | HEART RATE: 78 BPM | HEIGHT: 66 IN | OXYGEN SATURATION: 98 %

## 2021-02-19 DIAGNOSIS — J02.9 SORE THROAT: Primary | ICD-10-CM

## 2021-02-19 DIAGNOSIS — E07.9 THYROID MASS: ICD-10-CM

## 2021-02-19 DIAGNOSIS — L81.9 PERIORAL HYPERPIGMENTATION: ICD-10-CM

## 2021-02-19 DIAGNOSIS — R05.9 COUGH: ICD-10-CM

## 2021-02-19 PROCEDURE — 99213 OFFICE O/P EST LOW 20 MIN: CPT | Performed by: NURSE PRACTITIONER

## 2021-02-19 NOTE — PROGRESS NOTES
Yris Levine is a 41 y.o. female    Chief Complaint   Patient presents with    Follow-up     3 month follow up       Visit Vitals  /82 (BP 1 Location: Left upper arm, BP Patient Position: Sitting, BP Cuff Size: Small adult)   Pulse 78   Temp 98.6 °F (37 °C)   Resp 16   Ht 5' 6\" (1.676 m)   Wt 194 lb 9.6 oz (88.3 kg)   SpO2 98%   BMI 31.41 kg/m²           1. Have you been to the ER, urgent care clinic since your last visit? Hospitalized since your last visit? No     2. Have you seen or consulted any other health care providers outside of the [address] [place] [address] since your last visit? Include any pap smears or colon screening.  No

## 2021-02-19 NOTE — PROGRESS NOTES
Subjective:     Chief Complaint   Patient presents with    Follow-up     3 month follow up        History of present illness:  Gonzalez Frances is a 41 y.o. female who presents today with complaints of recurrence of cough, swelling to neck, and hyperpigmentation to lips. The patient has a past history of right thyroid cyst, and had a fine-needle biopsy performed on 10/27/2020. Prior to this, the patient had a cough, but appeared to have resolved after she had the fine-needle biopsy performed and the cyst aspirated of contents. Now, the patient states she has started coughing again, and believes that her cyst has started to fill again causing pressure on her throat. She admits to slight soreness when swallowing, but denies difficulty with swallowing or symptoms of aspiration. Additionally, she also complains of darkened areas to her upper and lower lips. She believes this is related to the swelling in her neck.     Patient Active Problem List   Diagnosis Code    Chest pain R07.9    Abnormal thyroid ultrasound R93.89    Thyroid enlarged E04.9    Cough R05      Past Medical History:   Diagnosis Date    Abnormal Pap smear     2008    Asthma     Asthma     GERD (gastroesophageal reflux disease)       No Known Allergies   Family History   Problem Relation Age of Onset    Cancer Father     Stroke Maternal Grandfather     Hypertension Maternal Grandfather     Diabetes Paternal Grandmother     Hypertension Paternal Grandmother     No Known Problems Mother       Social History     Socioeconomic History    Marital status:      Spouse name: Not on file    Number of children: Not on file    Years of education: Not on file    Highest education level: Not on file   Occupational History    Not on file   Social Needs    Financial resource strain: Not on file    Food insecurity     Worry: Not on file     Inability: Not on file    Transportation needs     Medical: Not on file     Non-medical: Not on file   Tobacco Use    Smoking status: Never Smoker    Smokeless tobacco: Never Used   Substance and Sexual Activity    Alcohol use: Yes    Drug use: No    Sexual activity: Yes     Partners: Male     Birth control/protection: None   Lifestyle    Physical activity     Days per week: Not on file     Minutes per session: Not on file    Stress: Not on file   Relationships    Social connections     Talks on phone: Not on file     Gets together: Not on file     Attends Adventism service: Not on file     Active member of club or organization: Not on file     Attends meetings of clubs or organizations: Not on file     Relationship status: Not on file    Intimate partner violence     Fear of current or ex partner: Not on file     Emotionally abused: Not on file     Physically abused: Not on file     Forced sexual activity: Not on file   Other Topics Concern    Not on file   Social History Narrative    Not on file     Prior to Admission medications    Medication Sig Start Date End Date Taking? Authorizing Provider   MULTIVITAMIN PO Take  by mouth. Yes Provider, Historical   APPLE CIDER VINEGAR PO Take  by mouth. Yes Provider, Historical   benzocaine-menthoL (CHLORASEPTIC MAX) 15-10 mg lozg lozenge Take 1 Lozenge by mouth every two (2) hours as needed for Sore throat. 2/19/21  Yes Niranjan Peppers D, NP   benzonatate (TESSALON) 100 mg capsule Take 1 Cap by mouth three (3) times daily as needed for Cough for up to 30 doses. 2/17/21   Niranjan Peppers D, NP   albuterol (PROVENTIL HFA, VENTOLIN HFA) 90 mcg/actuation inhaler Take 2 Puffs by inhalation every four (4) hours as needed. Indications: BRONCHOSPASM PREVENTION    Provider, Historical        Review of Systems              Constitutional:  She denies fever, weight loss, sweats or fatigue. EYES: No visual disturbance or changes. ENT: See HPI  Respiratory: Positive for cough, but without wheezing or shortness of breath.   No sputum production. Cardiac:  Denies chest pain, palpitations, unexplained indigestion, syncope, edema, PND or orthopnea. Skin: Darkened areas to lips. Neurological:  No numbness, tingling, burning paresthesias or loss of motor strength. Hematologic:  No unexplained bruising or purpura. Lymphatic: No lymph node enlargement    Objective:     Vitals:    02/19/21 1615   BP: 127/82   Pulse: 78   Resp: 16   Temp: 98.6 °F (37 °C)   SpO2: 98%   Weight: 194 lb 9.6 oz (88.3 kg)   Height: 5' 6\" (1.676 m)   PainSc:   0 - No pain       Body mass index is 31.41 kg/m². Physical Examination:              General Appearance:  Well-developed, well-nourished, no acute distress. Eyes:  PERRLA. Extraocular muscle function intact. Lids and conjunctiva not injected. No icteric sclera. Pharynx:  Clear with teeth in good repair. No oral masses were noted. Neck: There is slight palpable swelling to the thyroid gland. Lungs:  Clear to auscultation and percussion X5. No abnormal breath sounds. Cardiac:  Regular rate and rhythm without murmur. PMI not displaced. No gallop, rub or click. Skin: Irregular hyperpigmented patches to upper and lower lips. Lymph Nodes: No cervical lymphadenopathy. Neurological: . Alert and oriented x3. Station and gait normal.   Hematologic:   No purpura or petechiae. Assessment/Plan:         1. Sore throat    2. Cough    3. Thyroid mass    4. Perioral hyperpigmentation        Impressions/Plan:    1: As discussed with the patient, I believe her cyst has refilled with contents again and is the cause of her cough and sore throat. 2: I will arrange for an MRI of her neck for further evaluation of soft tissue structures and cyst(s). 3: Chloraseptic lozenges will be provided for comfort. 4: We will likely refer back to ENT for further evaluation.     Orders Placed This Encounter    MRI CERV SPINE W WO CONT    MULTIVITAMIN PO    APPLE CIDER VINEGAR PO    benzocaine-menthoL (CHLORASEPTIC MAX) 15-10 mg lozg lozenge       Follow-up and Dispositions    · Return if symptoms worsen or fail to improve. No results found for any visits on 02/19/21. Signed,  Fredo Bridges. NEVIN Krueger APRN FNP-BC    The patient was given after the visit summary the patient verbalized an understanding of the plans and problems as explained.

## 2021-02-22 PROBLEM — L81.9: Status: ACTIVE | Noted: 2021-02-22

## 2021-02-22 PROBLEM — E07.9 THYROID MASS: Status: ACTIVE | Noted: 2020-11-20

## 2021-03-04 DIAGNOSIS — E07.9 THYROID MASS: Primary | ICD-10-CM

## 2021-04-09 ENCOUNTER — OFFICE VISIT (OUTPATIENT)
Dept: INTERNAL MEDICINE CLINIC | Age: 46
End: 2021-04-09
Payer: COMMERCIAL

## 2021-04-09 VITALS
WEIGHT: 197.2 LBS | HEIGHT: 66 IN | OXYGEN SATURATION: 98 % | SYSTOLIC BLOOD PRESSURE: 120 MMHG | HEART RATE: 74 BPM | DIASTOLIC BLOOD PRESSURE: 78 MMHG | RESPIRATION RATE: 16 BRPM | BODY MASS INDEX: 31.69 KG/M2 | TEMPERATURE: 97.5 F

## 2021-04-09 DIAGNOSIS — S96.912A SPRAIN AND STRAIN OF LEFT ANKLE: Primary | ICD-10-CM

## 2021-04-09 DIAGNOSIS — S93.402A SPRAIN AND STRAIN OF LEFT ANKLE: Primary | ICD-10-CM

## 2021-04-09 PROCEDURE — 99212 OFFICE O/P EST SF 10 MIN: CPT | Performed by: NURSE PRACTITIONER

## 2021-04-09 RX ORDER — ALBUTEROL SULFATE 90 UG/1
2 AEROSOL, METERED RESPIRATORY (INHALATION)
Qty: 1 INHALER | Refills: 1 | Status: SHIPPED | OUTPATIENT
Start: 2021-04-09 | End: 2021-06-07 | Stop reason: SDUPTHER

## 2021-04-09 NOTE — PROGRESS NOTES
Subjective:     Chief Complaint   Patient presents with    Ankle Injury     acute care        History of present illness:  Dave Wong is a 41 y.o. female who presents today with pain to her lateral left ankle for approximately 1 month after mis-stepping on a rock and causing her ankle to twist.  She denies any bruising or significant swelling to the area, but does have some minimal swelling and is able to ambulate without assist.  She denies any numbness or tingling to the area. She has been using some ice to the area, and doing some foot range of motion exercises. She is here for further evaluation because the pain has not completely gone away yet. Patient Active Problem List   Diagnosis Code    Chest pain R07.9    Thyroid mass E07.9    Thyroid enlarged E04.9    Cough R05    Perioral hyperpigmentation L81.9      Past Medical History:   Diagnosis Date    Abnormal Pap smear     2008    Asthma     Asthma     GERD (gastroesophageal reflux disease)       No Known Allergies   Family History   Problem Relation Age of Onset    Cancer Father     Stroke Maternal Grandfather     Hypertension Maternal Grandfather     Diabetes Paternal Grandmother     Hypertension Paternal Grandmother     No Known Problems Mother       Social History     Socioeconomic History    Marital status:      Spouse name: Not on file    Number of children: Not on file    Years of education: Not on file    Highest education level: Not on file   Occupational History    Not on file   Social Needs    Financial resource strain: Not on file    Food insecurity     Worry: Not on file     Inability: Not on file    Transportation needs     Medical: Not on file     Non-medical: Not on file   Tobacco Use    Smoking status: Never Smoker    Smokeless tobacco: Never Used   Substance and Sexual Activity    Alcohol use:  Yes    Drug use: No    Sexual activity: Yes     Partners: Male     Birth control/protection: None Lifestyle    Physical activity     Days per week: Not on file     Minutes per session: Not on file    Stress: Not on file   Relationships    Social connections     Talks on phone: Not on file     Gets together: Not on file     Attends Bahai service: Not on file     Active member of club or organization: Not on file     Attends meetings of clubs or organizations: Not on file     Relationship status: Not on file    Intimate partner violence     Fear of current or ex partner: Not on file     Emotionally abused: Not on file     Physically abused: Not on file     Forced sexual activity: Not on file   Other Topics Concern    Not on file   Social History Narrative    Not on file     Prior to Admission medications    Medication Sig Start Date End Date Taking? Authorizing Provider   MULTIVITAMIN PO Take  by mouth. Yes Provider, Historical   APPLE CIDER VINEGAR PO Take  by mouth. Yes Provider, Historical   benzocaine-menthoL (CHLORASEPTIC MAX) 15-10 mg lozg lozenge Take 1 Lozenge by mouth every two (2) hours as needed for Sore throat. 2/19/21   Bibi Copping D, NP   benzonatate (TESSALON) 100 mg capsule Take 1 Cap by mouth three (3) times daily as needed for Cough for up to 30 doses. 2/17/21   Bibi Copping D, NP   albuterol (PROVENTIL HFA, VENTOLIN HFA) 90 mcg/actuation inhaler Take 2 Puffs by inhalation every four (4) hours as needed. Indications: BRONCHOSPASM PREVENTION    Provider, Historical        Review of Systems  Respiratory:  No cough, wheezing or shortness of breath. No sputum production. Cardiac:  Denies chest pain, palpitations, unexplained indigestion, syncope, edema, PND or orthopnea. Extremities: See HPI. Skin:  No recent rashes or unexplained bruising. Neurological:  No numbness, tingling, burning paresthesias or loss of motor strength. Hematologic:  No unexplained bruising or purpura.   Lymphatic: No lymph node enlargement    Objective:     Vitals:    04/09/21 1419   BP: 120/78 Pulse: 74   Resp: 16   Temp: 97.5 °F (36.4 °C)   SpO2: 98%   Weight: 197 lb 3.2 oz (89.4 kg)   Height: 5' 6\" (1.676 m)   PainSc:   9   PainLoc: Ankle       Body mass index is 31.83 kg/m². Physical Examination:              General Appearance:  Well-developed, well-nourished, no acute distress. Neck:  Supple without thyromegaly or adenopathy. Lungs:  Clear to auscultation and percussion X5. No abnormal breath sounds. Cardiac:  Regular rate and rhythm without murmur. PMI not displaced. No gallop, rub or click. Extremities: Mild swelling to area of lateral malleolus of left ankle. There is no rash or bruising noted. There is mild tenderness on palpation. Range of motion normal.  Skin:  No rash or unusual bruising noted. Lymph Nodes: No significant lymphadenopathy  Neurological: . Station and gait guarded. Hematologic:   No purpura or petechiae        Assessment/Plan:           ICD-10-CM ICD-9-CM    1. Sprain and strain of left ankle  S93.402A 845.00     S96.912A       1: Patient has grade 1 sprain to left ankle. Patient to use over-the-counter NSAID of choice. 2: Elevate foot and apply ice packs when able. 3: May use 1 piece ankle brace for compression and stability. 4: Avoid any strenuous activities for now. 5: Follow-up with me if not improving or signs of worsening. Patient states understanding and agrees with plan. Signed,  Steven Ken.  Opal Jones, MSN APRN FNP-BC

## 2021-04-09 NOTE — TELEPHONE ENCOUNTER
Last Refill: N/A  Last Visit: 4/9/2021   Next Visit: 5/19/2021    Requested Prescriptions     Pending Prescriptions Disp Refills    albuterol (PROVENTIL HFA, VENTOLIN HFA, PROAIR HFA) 90 mcg/actuation inhaler 1 Inhaler 1     Sig: Take 2 Puffs by inhalation every four (4) hours as needed.  Indications: bronchospasm prevention

## 2021-04-09 NOTE — PROGRESS NOTES
Noy Desir is a 41 y.o. female    Chief Complaint   Patient presents with    Ankle Injury     acute care       Visit Vitals  /78 (BP 1 Location: Left upper arm, BP Patient Position: Sitting, BP Cuff Size: Small adult)   Pulse 74   Temp 97.5 °F (36.4 °C)   Resp 16   Ht 5' 6\" (1.676 m)   Wt 197 lb 3.2 oz (89.4 kg)   SpO2 98%   BMI 31.83 kg/m²           1. Have you been to the ER, urgent care clinic since your last visit? Hospitalized since your last visit? No     2. Have you seen or consulted any other health care providers outside of the [address] [place] [address] since your last visit? Include any pap smears or colon screening.  No

## 2021-04-09 NOTE — PATIENT INSTRUCTIONS
Ankle Sprain: Rehab Exercises  Introduction  Here are some examples of exercises for you to try. The exercises may be suggested for a condition or for rehabilitation. Start each exercise slowly. Ease off the exercises if you start to have pain. You will be told when to start these exercises and which ones will work best for you. How to do the exercises  'Alphabet' exercise   1. Trace the alphabet with your toe. This helps your ankle move in all directions. Side-to-side knee swing exercise   1. Sit in a chair with your foot flat on the floor. 2. Slowly move your knee from side to side. Keep your foot pressed flat. 3. Continue this exercise for 2 to 3 minutes. Towel curl   1. While sitting, place your foot on a towel on the floor. Scrunch the towel toward you with your toes. 2. Then use your toes to push the towel away from you. 3. To make this exercise more challenging you can put something on the other end of the towel. A can of soup is about the right weight for this. Towel stretch   1. Sit with your legs extended and knees straight. 2. Place a towel around your foot just under the toes. 3. Hold each end of the towel in each hand, with your hands above your knees. 4. Pull back with the towel so that your foot stretches toward you. 5. Hold the position for at least 15 to 30 seconds. 6. Repeat 2 to 4 times a session. Do up to 5 sessions a day. Ankle eversion exercise   1. Start by sitting with your foot flat on the floor. Push your foot outward against a wall or a piece of furniture that doesn't move. Hold for about 6 seconds, and relax. Repeat 8 to 12 times. 2. After you feel comfortable with this, try using rubber tubing looped around the outside of your feet for resistance. Push your foot out to the side against the tubing, and then count to 10 as you slowly bring your foot back to the middle. Repeat 8 to 12 times. Isometric opposition exercises   1.  While sitting, put your feet together flat on the floor. 2. Press your injured foot inward against your other foot. Hold for about 6 seconds, and relax. Repeat 8 to 12 times. 3. Then place the heel of your other foot on top of the injured one. Push down with the top heel while trying to push up with your injured foot. Hold for about 6 seconds, and relax. Repeat 8 to 12 times. Resisted ankle inversion   1. Sit on the floor with your good leg crossed over your other leg. 2. Hold both ends of an exercise band and loop the band around the inside of your affected foot. Then press your other foot against the band. 3. Keeping your legs crossed, slowly push your affected foot against the band so that foot moves away from your other foot. Then slowly relax. 4. Repeat 8 to 12 times. Resisted ankle eversion   1. Sit on the floor with your legs straight. 2. Hold both ends of an exercise band and loop the band around the outside of your affected foot. Then press your other foot against the band. 3. Keeping your leg straight, slowly push your affected foot outward against the band and away from your other foot without letting your leg rotate. Then slowly relax. 4. Repeat 8 to 12 times. Resisted ankle dorsiflexion   1. Tie the ends of an exercise band together to form a loop. Attach one end of the loop to a secure object or shut a door on it to hold it in place. (Or you can have someone hold one end of the loop to provide resistance.)  2. While sitting on the floor or in a chair, loop the other end of the band over the top of your affected foot. 3. Keeping your knee and leg straight, slowly flex your foot to pull back on the exercise band, and then slowly relax. 4. Repeat 8 to 12 times. Single-leg balance   1. Stand on a flat surface with your arms stretched out to your sides like you are making the letter \"T. \" Then lift your good leg off the floor, bending it at the knee.  If you are not steady on your feet, use one hand to hold on to a chair, counter, or wall. 2. Standing on the leg with your affected ankle, keep that knee straight. Try to balance on that leg for up to 30 seconds. Then rest for up to 10 seconds. 3. Repeat 6 to 8 times. 4. When you can balance on your affected leg for 30 seconds with your eyes open, try to balance on it with your eyes closed. 5. When you can do this exercise with your eyes closed for 30 seconds and with ease and no pain, try standing on a pillow or piece of foam, and repeat steps 1 through 4. Follow-up care is a key part of your treatment and safety. Be sure to make and go to all appointments, and call your doctor if you are having problems. It's also a good idea to know your test results and keep a list of the medicines you take. Where can you learn more? Go to http://www.gray.com/  Enter [employer] Search in the search box to learn more about \"Ankle Sprain: Rehab Exercises. \"  Current as of: November 16, 2020               Content Version: 12.8  © 2006-2021 [employer]. Care instructions adapted under license by [employer] (which disclaims liability or warranty for this information). If you have questions about a medical condition or this instruction, always ask your healthcare professional. [address] any warranty or liability for your use of this information.

## 2021-06-01 ENCOUNTER — TELEPHONE (OUTPATIENT)
Dept: INTERNAL MEDICINE CLINIC | Age: 46
End: 2021-06-01

## 2021-06-01 NOTE — TELEPHONE ENCOUNTER
Pt states that she has a UTI and would like to receive something to help with this. Some of her symptoms are frequent urinating, strong odor and she states that it is very uncomfortable. Please advise. Thank you.

## 2021-06-02 ENCOUNTER — LAB ONLY (OUTPATIENT)
Dept: INTERNAL MEDICINE CLINIC | Age: 46
End: 2021-06-02

## 2021-06-02 DIAGNOSIS — R30.0 DYSURIA: Primary | ICD-10-CM

## 2021-06-02 LAB
APPEARANCE UR: ABNORMAL
BILIRUB UR QL: NEGATIVE
COLOR UR: ABNORMAL
GLUCOSE UR STRIP.AUTO-MCNC: NEGATIVE MG/DL
HGB UR QL STRIP: NEGATIVE
KETONES UR QL STRIP.AUTO: NEGATIVE MG/DL
LEUKOCYTE ESTERASE UR QL STRIP.AUTO: NEGATIVE
NITRITE UR QL STRIP.AUTO: NEGATIVE
PH UR STRIP: 8 [PH] (ref 5–8)
PROT UR STRIP-MCNC: NEGATIVE MG/DL
SP GR UR REFRACTOMETRY: 1.02 (ref 1–1.03)
UROBILINOGEN UR QL STRIP.AUTO: 1 EU/DL (ref 0.2–1)

## 2021-06-03 NOTE — PROGRESS NOTES
Urine shows to be a bit cloudy, but no signs of infection presently. We are sending out a sample for a culture and sensitivity to be sure. Please await results.

## 2021-06-05 LAB
BACTERIA SPEC CULT: ABNORMAL
CC UR VC: ABNORMAL
SERVICE CMNT-IMP: ABNORMAL

## 2021-06-07 DIAGNOSIS — N39.0 URINARY TRACT INFECTION WITHOUT HEMATURIA, SITE UNSPECIFIED: Primary | ICD-10-CM

## 2021-06-07 RX ORDER — ALBUTEROL SULFATE 90 UG/1
2 AEROSOL, METERED RESPIRATORY (INHALATION)
Qty: 1 INHALER | Refills: 1 | Status: SHIPPED | OUTPATIENT
Start: 2021-06-07 | End: 2022-01-19 | Stop reason: SDUPTHER

## 2021-06-07 RX ORDER — SULFAMETHOXAZOLE AND TRIMETHOPRIM 800; 160 MG/1; MG/1
1 TABLET ORAL 2 TIMES DAILY
Qty: 14 TABLET | Refills: 0 | Status: SHIPPED | OUTPATIENT
Start: 2021-06-07 | End: 2022-09-08 | Stop reason: ALTCHOICE

## 2021-06-07 NOTE — TELEPHONE ENCOUNTER
PCP: Turner Segura NP    Last appt: 5/19/2021  No future appointments. Requested Prescriptions     Pending Prescriptions Disp Refills    albuterol (PROVENTIL HFA, VENTOLIN HFA, PROAIR HFA) 90 mcg/actuation inhaler 1 Inhaler 1     Sig: Take 2 Puffs by inhalation every four (4) hours as needed (bronchospasm prevention).  Indications: bronchospasm prevention         Other Comments:

## 2021-06-07 NOTE — TELEPHONE ENCOUNTER
Last Refill: 04/19/21  Last Visit: 5/19/2021   Next Visit: Visit date not found    Requested Prescriptions     Pending Prescriptions Disp Refills    albuterol (PROVENTIL HFA, VENTOLIN HFA, PROAIR HFA) 90 mcg/actuation inhaler 1 Inhaler 1     Sig: Take 2 Puffs by inhalation every four (4) hours as needed (bronchospasm prevention).  Indications: bronchospasm prevention

## 2022-01-19 NOTE — TELEPHONE ENCOUNTER
Last Refill:06/07/21  Last Visit: 04/09/21  Next Visit: Visit date not found    Requested Prescriptions     Pending Prescriptions Disp Refills    albuterol (PROVENTIL HFA, VENTOLIN HFA, PROAIR HFA) 90 mcg/actuation inhaler 1 Each 5     Sig: Take 2 Puffs by inhalation every four (4) hours as needed (bronchospasm prevention).  Indications: bronchospasm prevention

## 2022-01-20 RX ORDER — ALBUTEROL SULFATE 90 UG/1
2 AEROSOL, METERED RESPIRATORY (INHALATION)
Qty: 1 EACH | Refills: 5 | Status: SHIPPED | OUTPATIENT
Start: 2022-01-20

## 2022-02-13 ENCOUNTER — APPOINTMENT (OUTPATIENT)
Dept: CT IMAGING | Age: 47
End: 2022-02-13
Attending: STUDENT IN AN ORGANIZED HEALTH CARE EDUCATION/TRAINING PROGRAM
Payer: COMMERCIAL

## 2022-02-13 ENCOUNTER — APPOINTMENT (OUTPATIENT)
Dept: GENERAL RADIOLOGY | Age: 47
End: 2022-02-13
Attending: STUDENT IN AN ORGANIZED HEALTH CARE EDUCATION/TRAINING PROGRAM
Payer: COMMERCIAL

## 2022-02-13 ENCOUNTER — HOSPITAL ENCOUNTER (EMERGENCY)
Age: 47
Discharge: HOME | End: 2022-02-13
Attending: EMERGENCY MEDICINE
Payer: COMMERCIAL

## 2022-02-13 VITALS
DIASTOLIC BLOOD PRESSURE: 83 MMHG | WEIGHT: 193 LBS | TEMPERATURE: 97.3 F | SYSTOLIC BLOOD PRESSURE: 139 MMHG | OXYGEN SATURATION: 98 % | BODY MASS INDEX: 34.2 KG/M2 | RESPIRATION RATE: 15 BRPM | HEIGHT: 63 IN | HEART RATE: 64 BPM

## 2022-02-13 DIAGNOSIS — G44.319 ACUTE POST-TRAUMATIC HEADACHE, NOT INTRACTABLE: ICD-10-CM

## 2022-02-13 DIAGNOSIS — R55 SYNCOPE AND COLLAPSE: Primary | ICD-10-CM

## 2022-02-13 DIAGNOSIS — S62.619A: ICD-10-CM

## 2022-02-13 LAB
ALBUMIN SERPL-MCNC: 3.9 G/DL (ref 3.5–5)
ALBUMIN/GLOB SERPL: 0.9 {RATIO} (ref 1.1–2.2)
ALP SERPL-CCNC: 101 U/L (ref 45–117)
ALT SERPL-CCNC: 21 U/L (ref 12–78)
ANION GAP SERPL CALC-SCNC: 1 MMOL/L (ref 5–15)
AST SERPL-CCNC: 19 U/L (ref 15–37)
BASOPHILS # BLD: 0 K/UL (ref 0–0.1)
BASOPHILS NFR BLD: 0 % (ref 0–1)
BILIRUB SERPL-MCNC: 0.4 MG/DL (ref 0.2–1)
BUN SERPL-MCNC: 13 MG/DL (ref 6–20)
BUN/CREAT SERPL: 18 (ref 12–20)
CALCIUM SERPL-MCNC: 9.4 MG/DL (ref 8.5–10.1)
CHLORIDE SERPL-SCNC: 111 MMOL/L (ref 97–108)
CO2 SERPL-SCNC: 25 MMOL/L (ref 21–32)
COMMENT, HOLDF: NORMAL
CREAT SERPL-MCNC: 0.71 MG/DL (ref 0.55–1.02)
DIFFERENTIAL METHOD BLD: ABNORMAL
EOSINOPHIL # BLD: 0.2 K/UL (ref 0–0.4)
EOSINOPHIL NFR BLD: 2 % (ref 0–7)
ERYTHROCYTE [DISTWIDTH] IN BLOOD BY AUTOMATED COUNT: 16.1 % (ref 11.5–14.5)
GLOBULIN SER CALC-MCNC: 4.5 G/DL (ref 2–4)
GLUCOSE BLD STRIP.AUTO-MCNC: 85 MG/DL (ref 65–117)
GLUCOSE SERPL-MCNC: 86 MG/DL (ref 65–100)
HCT VFR BLD AUTO: 44.8 % (ref 35–47)
HGB BLD-MCNC: 13.7 G/DL (ref 11.5–16)
IMM GRANULOCYTES # BLD AUTO: 0 K/UL (ref 0–0.04)
IMM GRANULOCYTES NFR BLD AUTO: 0 % (ref 0–0.5)
LYMPHOCYTES # BLD: 1.9 K/UL (ref 0.8–3.5)
LYMPHOCYTES NFR BLD: 24 % (ref 12–49)
MCH RBC QN AUTO: 24.2 PG (ref 26–34)
MCHC RBC AUTO-ENTMCNC: 30.6 G/DL (ref 30–36.5)
MCV RBC AUTO: 79 FL (ref 80–99)
MONOCYTES # BLD: 0.6 K/UL (ref 0–1)
MONOCYTES NFR BLD: 8 % (ref 5–13)
NEUTS SEG # BLD: 5.2 K/UL (ref 1.8–8)
NEUTS SEG NFR BLD: 66 % (ref 32–75)
NRBC # BLD: 0 K/UL (ref 0–0.01)
NRBC BLD-RTO: 0 PER 100 WBC
PLATELET # BLD AUTO: 233 K/UL (ref 150–400)
POTASSIUM SERPL-SCNC: 4.2 MMOL/L (ref 3.5–5.1)
PROT SERPL-MCNC: 8.4 G/DL (ref 6.4–8.2)
RBC # BLD AUTO: 5.67 M/UL (ref 3.8–5.2)
SAMPLES BEING HELD,HOLD: NORMAL
SERVICE CMNT-IMP: NORMAL
SODIUM SERPL-SCNC: 137 MMOL/L (ref 136–145)
TROPONIN-HIGH SENSITIVITY: 4 NG/L (ref 0–51)
WBC # BLD AUTO: 7.9 K/UL (ref 3.6–11)

## 2022-02-13 PROCEDURE — 93005 ELECTROCARDIOGRAM TRACING: CPT

## 2022-02-13 PROCEDURE — 36415 COLL VENOUS BLD VENIPUNCTURE: CPT

## 2022-02-13 PROCEDURE — 74011000250 HC RX REV CODE- 250: Performed by: STUDENT IN AN ORGANIZED HEALTH CARE EDUCATION/TRAINING PROGRAM

## 2022-02-13 PROCEDURE — 82962 GLUCOSE BLOOD TEST: CPT

## 2022-02-13 PROCEDURE — 70450 CT HEAD/BRAIN W/O DYE: CPT

## 2022-02-13 PROCEDURE — 75810000053 HC SPLINT APPLICATION

## 2022-02-13 PROCEDURE — 85025 COMPLETE CBC W/AUTO DIFF WBC: CPT

## 2022-02-13 PROCEDURE — 80053 COMPREHEN METABOLIC PANEL: CPT

## 2022-02-13 PROCEDURE — 73130 X-RAY EXAM OF HAND: CPT

## 2022-02-13 PROCEDURE — 74011250637 HC RX REV CODE- 250/637: Performed by: STUDENT IN AN ORGANIZED HEALTH CARE EDUCATION/TRAINING PROGRAM

## 2022-02-13 PROCEDURE — 99284 EMERGENCY DEPT VISIT MOD MDM: CPT

## 2022-02-13 PROCEDURE — 84484 ASSAY OF TROPONIN QUANT: CPT

## 2022-02-13 RX ORDER — BACITRACIN 500 UNIT/G
1 PACKET (EA) TOPICAL
Status: COMPLETED | OUTPATIENT
Start: 2022-02-13 | End: 2022-02-13

## 2022-02-13 RX ORDER — HYDROCODONE BITARTRATE AND ACETAMINOPHEN 5; 325 MG/1; MG/1
1 TABLET ORAL ONCE
Status: COMPLETED | OUTPATIENT
Start: 2022-02-13 | End: 2022-02-13

## 2022-02-13 RX ORDER — ONDANSETRON 4 MG/1
4 TABLET, ORALLY DISINTEGRATING ORAL
Status: COMPLETED | OUTPATIENT
Start: 2022-02-13 | End: 2022-02-13

## 2022-02-13 RX ADMIN — BACITRACIN 1 PACKET: 500 OINTMENT TOPICAL at 14:51

## 2022-02-13 RX ADMIN — ONDANSETRON 4 MG: 4 TABLET, ORALLY DISINTEGRATING ORAL at 13:19

## 2022-02-13 RX ADMIN — HYDROCODONE BITARTRATE AND ACETAMINOPHEN 1 TABLET: 5; 325 TABLET ORAL at 13:19

## 2022-02-13 NOTE — ED NOTES
Ulnar gutter splint applied, patient has good cap refill to left hand after splint application. I have reviewed discharge instructions with the patient. The patient verbalized understanding.  Patient ambulatory out of ED

## 2022-02-13 NOTE — DISCHARGE INSTRUCTIONS
Keep splint on hand until cleared by orthopedic doctor. Please follow-up with Ortho and PCP as soon as possible, return with any chest pain, shortness of breath or repeated syncope.

## 2022-02-13 NOTE — ED PROVIDER NOTES
52-year-old female with history of asthma and GERD presents to ED status post syncopal episode. Patient reports that she was walking from her car to her house when she suddenly \"blacked out\" and woke up on the sidewalk. She did not feel dizzy before. She does note that this happened in a similar manner 3 to 5 years ago but has not happened since. She is unsure of head trauma but notes that she has now a headache and is nauseous. She also reports left hand pain especially in her pinky finger with decreased range of motion and some swelling. She has not taken anything for her pain. She denies any vision changes, vomiting, diarrhea, fevers, chills, or confusion. Past Medical History:   Diagnosis Date    Abnormal Pap smear     2008    Asthma     Asthma     GERD (gastroesophageal reflux disease)        Past Surgical History:   Procedure Laterality Date    HX OTHER SURGICAL      d&c 2005         Family History:   Problem Relation Age of Onset    Cancer Father     Stroke Maternal Grandfather     Hypertension Maternal Grandfather     Diabetes Paternal Grandmother     Hypertension Paternal Grandmother     No Known Problems Mother        Social History     Socioeconomic History    Marital status:      Spouse name: Not on file    Number of children: Not on file    Years of education: Not on file    Highest education level: Not on file   Occupational History    Not on file   Tobacco Use    Smoking status: Never Smoker    Smokeless tobacco: Never Used   Substance and Sexual Activity    Alcohol use:  Yes    Drug use: No    Sexual activity: Yes     Partners: Male     Birth control/protection: None   Other Topics Concern    Not on file   Social History Narrative    Not on file     Social Determinants of Health     Financial Resource Strain:     Difficulty of Paying Living Expenses: Not on file   Food Insecurity:     Worried About Running Out of Food in the Last Year: Not on file   [facility] Ran Out of Food in the Last Year: Not on file   Transportation Needs:     Lack of Transportation (Medical): Not on file    Lack of Transportation (Non-Medical): Not on file   Physical Activity:     Days of Exercise per Week: Not on file    Minutes of Exercise per Session: Not on file   Stress:     Feeling of Stress : Not on file   Social Connections:     Frequency of Communication with Friends and Family: Not on file    Frequency of Social Gatherings with Friends and Family: Not on file    Attends Yazidism Services: Not on file    Active Member of [address] [place] [address] [employer] or Organizations: Not on file    Attends Club or Organization Meetings: Not on file    Marital Status: Not on file   Intimate Partner Violence:     Fear of Current or Ex-Partner: Not on file    Emotionally Abused: Not on file    Physically Abused: Not on file    Sexually Abused: Not on file   Housing Stability:     Unable to Pay for Housing in the Last Year: Not on file    Number of [place] in the Last Year: Not on file    Unstable Housing in the Last Year: Not on file         ALLERGIES: Patient has no known allergies. Review of Systems   Constitutional: Negative for fever. HENT: Negative for congestion and sinus pressure. Respiratory: Negative for shortness of breath. Cardiovascular: Negative for chest pain. Gastrointestinal: Positive for nausea. Negative for vomiting. Genitourinary: Negative for dysuria. Musculoskeletal: Positive for arthralgias. Negative for myalgias. Neurological: Positive for light-headedness and headaches. Negative for dizziness. Hematological: Negative for adenopathy. Psychiatric/Behavioral: The patient is not nervous/anxious. All other systems reviewed and are negative. Vitals:    02/13/22 1207   BP: 139/83   Pulse: 64   Resp: 15   Temp: 97.3 °F (36.3 °C)   SpO2: 98%   Weight: 87.5 kg (193 lb)   Height: 5' 3\" (1.6 m)            Physical Exam  Vitals and nursing note reviewed. Constitutional:       General: She is not in acute distress. Appearance: Normal appearance. She is normal weight. HENT:      Head: Normocephalic and atraumatic. Eyes:      Extraocular Movements: Extraocular movements intact. Pupils: Pupils are equal, round, and reactive to light. Cardiovascular:      Rate and Rhythm: Normal rate and regular rhythm. Heart sounds: Normal heart sounds. Pulmonary:      Breath sounds: Normal breath sounds. Abdominal:      Palpations: Abdomen is soft. Tenderness: There is no abdominal tenderness. Musculoskeletal:      Right hand: Normal.      Left hand: Swelling and bony tenderness (to ulnar aspect left hand) present. No deformity. Decreased range of motion (flexion and extension of left 5th digit). Lymphadenopathy:      Cervical: No cervical adenopathy. Skin:     General: Skin is warm and dry. Neurological:      General: No focal deficit present. Mental Status: She is alert and oriented to person, place, and time. Cranial Nerves: No cranial nerve deficit. Psychiatric:         Mood and Affect: Mood normal.         Behavior: Behavior normal.         Thought Content: Thought content normal.          MDM  Number of Diagnoses or Management Options  Acute post-traumatic headache, not intractable  Closed fracture of proximal phalanx of left hand, initial encounter  Syncope and collapse  Diagnosis management comments: 43-year-old female with history of asthma and GERD presents to ED status post syncopal episode. Vital signs stable on triage. Physical exam unremarkable aside from some bony tenderness to ulnar aspect of left hand and decreased range of motion of fifth digit of left hand. X-ray revealed an acute fracture of the base of the proximal phalanx of the little finger. See below for Ortho consultation and neck steps. Labs unremarkable and troponin negative.   EKG revealed rate of 64 bpm with normal interval, normal axis, normal sinus rhythm with nonspecific T wave abnormalities. CT of head unremarkable. Patient will be splinted as outlined and discharged with instructions for follow-up, conservative care and return precautions.        Amount and/or Complexity of Data Reviewed  Clinical lab tests: ordered and reviewed  Tests in the radiology section of CPT®: ordered and reviewed  Discuss the patient with other providers: yes      ED Course as of 02/13/22 1440   Sun Feb 13, 2022   1434 Consulted with ortho on-call Dr. Camila Nicole who recommended ulnar gutter splint, follow-up with Dr. Stephenie Will.  [AM]      ED Course User Index  [AM] MARTHA Kohler       Procedures

## 2022-02-13 NOTE — ED TRIAGE NOTES
Pt ambulatory to ED with c/o \"passing out today at 1037. The last thing I remember is that I was going to breakfast. I started the car and was walking back to the house. It happened once before about 3-5 years ago\". Pt fell onto sidewalk and injuryed left hand. Glucose 85 mg/dl in triage.

## 2022-02-13 NOTE — Clinical Note
[address] [address]  [phone]    Work/School Note    Date: 2/13/2022    To Whom It May concern: Viona Hodgkin was seen and treated today in the emergency room by the following provider(s):  Attending Provider: Burke Figueroa MD  Physician Assistant: MARTHA Tidwell. Viona Hodgkin is excused from work/school on 02/13/22 and 02/14/22. She is medically clear to return to work/school on 2/15/2022.        Sincerely,          MARTHA Jones

## 2022-02-14 LAB
ATRIAL RATE: 64 BPM
CALCULATED P AXIS, ECG09: 34 DEGREES
CALCULATED R AXIS, ECG10: 32 DEGREES
CALCULATED T AXIS, ECG11: 35 DEGREES
DIAGNOSIS, 93000: NORMAL
P-R INTERVAL, ECG05: 132 MS
Q-T INTERVAL, ECG07: 434 MS
QRS DURATION, ECG06: 82 MS
QTC CALCULATION (BEZET), ECG08: 447 MS
VENTRICULAR RATE, ECG03: 64 BPM

## 2022-03-18 PROBLEM — E04.9 THYROID ENLARGED: Status: ACTIVE | Noted: 2020-11-20

## 2022-03-19 PROBLEM — L81.9: Status: ACTIVE | Noted: 2021-02-22

## 2022-03-19 PROBLEM — R07.9 CHEST PAIN: Status: ACTIVE | Noted: 2020-10-10

## 2022-03-19 PROBLEM — R05.9 COUGH: Status: ACTIVE | Noted: 2020-11-20

## 2022-03-19 PROBLEM — E07.9 THYROID MASS: Status: ACTIVE | Noted: 2020-11-20

## 2022-07-14 ENCOUNTER — TELEPHONE (OUTPATIENT)
Dept: INTERNAL MEDICINE CLINIC | Age: 47
End: 2022-07-14

## 2022-07-14 NOTE — TELEPHONE ENCOUNTER
----- Message from Bessie Faust sent at 7/14/2022  3:04 PM EDT -----  Subject: Message to Provider    QUESTIONS  Information for Provider? pt would like to get in earlier for physical and   pap sched 8/8/2022 screened green   ---------------------------------------------------------------------------  --------------  Essie Cuurio INFO  [phone]; OK to leave message on voicemail  ---------------------------------------------------------------------------  --------------  SCRIPT ANSWERS  Relationship to Patient?  Self

## 2022-09-07 ENCOUNTER — OFFICE VISIT (OUTPATIENT)
Dept: INTERNAL MEDICINE CLINIC | Age: 47
End: 2022-09-07
Payer: COMMERCIAL

## 2022-09-07 VITALS
RESPIRATION RATE: 20 BRPM | DIASTOLIC BLOOD PRESSURE: 68 MMHG | SYSTOLIC BLOOD PRESSURE: 120 MMHG | OXYGEN SATURATION: 98 % | WEIGHT: 192.8 LBS | HEIGHT: 63 IN | TEMPERATURE: 98.4 F | BODY MASS INDEX: 34.16 KG/M2 | HEART RATE: 82 BPM

## 2022-09-07 DIAGNOSIS — R10.9 LEFT FLANK PAIN: Primary | ICD-10-CM

## 2022-09-07 LAB
AMORPH CRY URNS QL MICRO: ABNORMAL
ANION GAP SERPL CALC-SCNC: 5 MMOL/L (ref 5–15)
APPEARANCE UR: ABNORMAL
BACTERIA URNS QL MICRO: ABNORMAL /HPF
BASOPHILS # BLD: 0 K/UL (ref 0–0.1)
BASOPHILS NFR BLD: 0 % (ref 0–1)
BILIRUB UR QL: NEGATIVE
BUN SERPL-MCNC: 16 MG/DL (ref 6–20)
BUN/CREAT SERPL: 21 (ref 12–20)
CALCIUM SERPL-MCNC: 9 MG/DL (ref 8.5–10.1)
CHLORIDE SERPL-SCNC: 108 MMOL/L (ref 97–108)
CO2 SERPL-SCNC: 27 MMOL/L (ref 21–32)
COLOR UR: ABNORMAL
CREAT SERPL-MCNC: 0.78 MG/DL (ref 0.55–1.02)
DIFFERENTIAL METHOD BLD: ABNORMAL
EOSINOPHIL # BLD: 0.1 K/UL (ref 0–0.4)
EOSINOPHIL NFR BLD: 1 % (ref 0–7)
EPITH CASTS URNS QL MICRO: ABNORMAL /LPF
ERYTHROCYTE [DISTWIDTH] IN BLOOD BY AUTOMATED COUNT: 15.8 % (ref 11.5–14.5)
GLUCOSE SERPL-MCNC: 79 MG/DL (ref 65–100)
GLUCOSE UR STRIP.AUTO-MCNC: NEGATIVE MG/DL
HCT VFR BLD AUTO: 40.6 % (ref 35–47)
HGB BLD-MCNC: 12.7 G/DL (ref 11.5–16)
HGB UR QL STRIP: ABNORMAL
IMM GRANULOCYTES # BLD AUTO: 0 K/UL (ref 0–0.04)
IMM GRANULOCYTES NFR BLD AUTO: 0 % (ref 0–0.5)
KETONES UR QL STRIP.AUTO: ABNORMAL MG/DL
LEUKOCYTE ESTERASE UR QL STRIP.AUTO: ABNORMAL
LYMPHOCYTES # BLD: 1.9 K/UL (ref 0.8–3.5)
LYMPHOCYTES NFR BLD: 14 % (ref 12–49)
MCH RBC QN AUTO: 24.5 PG (ref 26–34)
MCHC RBC AUTO-ENTMCNC: 31.3 G/DL (ref 30–36.5)
MCV RBC AUTO: 78.2 FL (ref 80–99)
MONOCYTES # BLD: 1.1 K/UL (ref 0–1)
MONOCYTES NFR BLD: 8 % (ref 5–13)
NEUTS SEG # BLD: 10.2 K/UL (ref 1.8–8)
NEUTS SEG NFR BLD: 77 % (ref 32–75)
NITRITE UR QL STRIP.AUTO: POSITIVE
NRBC # BLD: 0 K/UL (ref 0–0.01)
NRBC BLD-RTO: 0 PER 100 WBC
OTHER,OTHU: ABNORMAL
PH UR STRIP: 6 [PH] (ref 5–8)
PLATELET # BLD AUTO: 240 K/UL (ref 150–400)
PMV BLD AUTO: 10.9 FL (ref 8.9–12.9)
POTASSIUM SERPL-SCNC: 3.6 MMOL/L (ref 3.5–5.1)
PROT UR STRIP-MCNC: 300 MG/DL
RBC # BLD AUTO: 5.19 M/UL (ref 3.8–5.2)
RBC #/AREA URNS HPF: ABNORMAL /HPF (ref 0–5)
SODIUM SERPL-SCNC: 140 MMOL/L (ref 136–145)
SP GR UR REFRACTOMETRY: 1.02 (ref 1–1.03)
UROBILINOGEN UR QL STRIP.AUTO: 1 EU/DL (ref 0.2–1)
WBC # BLD AUTO: 13.3 K/UL (ref 3.6–11)
WBC URNS QL MICRO: >100 /HPF (ref 0–4)

## 2022-09-07 PROCEDURE — 99213 OFFICE O/P EST LOW 20 MIN: CPT | Performed by: INTERNAL MEDICINE

## 2022-09-07 RX ORDER — TAMSULOSIN HYDROCHLORIDE 0.4 MG/1
0.4 CAPSULE ORAL DAILY
Qty: 10 CAPSULE | Refills: 0 | Status: SHIPPED | OUTPATIENT
Start: 2022-09-07

## 2022-09-07 RX ORDER — CETIRIZINE HCL 10 MG
TABLET ORAL
COMMUNITY

## 2022-09-07 RX ORDER — KETOROLAC TROMETHAMINE 10 MG/1
10 TABLET, FILM COATED ORAL
Qty: 30 TABLET | Refills: 0 | Status: SHIPPED | OUTPATIENT
Start: 2022-09-07

## 2022-09-07 NOTE — PROGRESS NOTES
Jl West is a 56 y.o. female presenting for Side Pain (L)  . 1. Have you been to the ER, urgent care clinic since your last visit? Hospitalized since your last visit? No    2. Have you seen or consulted any other health care providers outside of the [address] [place] [address] since your last visit? Include any pap smears or colon screening. No    Fall Risk Assessment, last 12 mths 2/19/2021   Able to walk? Yes   Fall in past 12 months? 0   Do you feel unsteady? 0   Are you worried about falling 0         Abuse Screening Questionnaire 10/19/2020   Do you ever feel afraid of your partner? N   Are you in a relationship with someone who physically or mentally threatens you? N   Is it safe for you to go home? Y       3 most recent PHQ Screens 4/9/2021   Little interest or pleasure in doing things Not at all   Feeling down, depressed, irritable, or hopeless Not at all   Total Score PHQ 2 0       There are no discontinued medications.

## 2022-09-07 NOTE — PROGRESS NOTES
Emil Lane is a 56 y.o. female and presents with Side Pain (L)  . Subjective:  Mrs. Jose Clay presents today with complaint of left flank pain. Her symptoms awakened her from her sleep early this morning. She took some ibuprofen which did seem to help her symptoms somewhat. However they returned later in the day. The pain has been fairly persistent. It can be exacerbated by change in position or movement. The pain has not moved or radiated. She has had urinary frequency and some changes to the color of her urine. She also noted some mild pinkish discharge. She has no nausea or vomiting. Past Medical History:   Diagnosis Date    Abnormal Pap smear     2008    Asthma     Asthma     GERD (gastroesophageal reflux disease)      Past Surgical History:   Procedure Laterality Date    HX OTHER SURGICAL      d&c 2005     No Known Allergies  Current Outpatient Medications   Medication Sig Dispense Refill    cetirizine (ZyrTEC) 10 mg tablet Take  by mouth.      ketorolac (TORADOL) 10 mg tablet Take 1 Tablet by mouth three (3) times daily as needed for Pain. Indications: excessive pain 30 Tablet 0    tamsulosin (FLOMAX) 0.4 mg capsule Take 1 Capsule by mouth daily. Indications: stones in the urinary tract 10 Capsule 0    albuterol (PROVENTIL HFA, VENTOLIN HFA, PROAIR HFA) 90 mcg/actuation inhaler Take 2 Puffs by inhalation every four (4) hours as needed (bronchospasm prevention). Indications: bronchospasm prevention 1 Each 5    trimethoprim-sulfamethoxazole (BACTRIM DS, SEPTRA DS) 160-800 mg per tablet Take 1 Tablet by mouth two (2) times a day. Indications: bacterial urinary tract infection (Patient not taking: Reported on 9/7/2022) 14 Tablet 0    MULTIVITAMIN PO Take  by mouth. (Patient not taking: Reported on 9/7/2022)      APPLE CIDER VINEGAR PO Take  by mouth.  (Patient not taking: Reported on 9/7/2022)      benzocaine-menthoL (CHLORASEPTIC MAX) 15-10 mg lozg lozenge Take 1 Lozenge by mouth every two (2) hours as needed for Sore throat. (Patient not taking: Reported on 9/7/2022) 50 Lozenge 2    benzonatate (TESSALON) 100 mg capsule Take 1 Cap by mouth three (3) times daily as needed for Cough for up to 30 doses. (Patient not taking: Reported on 9/7/2022) 30 Cap 0     Social History     Socioeconomic History    Marital status:    Tobacco Use    Smoking status: Never    Smokeless tobacco: Never   Substance and Sexual Activity    Alcohol use: Yes    Drug use: No    Sexual activity: Yes     Partners: Male     Birth control/protection: None     Family History   Problem Relation Age of Onset    Cancer Father     Stroke Maternal Grandfather     Hypertension Maternal Grandfather     Diabetes Paternal Grandmother     Hypertension Paternal Grandmother     No Known Problems Mother        Review of Systems  Constitutional:  negative for fevers, chills, anorexia and weight loss  Eyes:    negative for visual disturbance and irritation  ENT:    negative for tinnitus,sore throat,nasal congestion,ear pains. hoarseness  Respiratory:     negative for cough, hemoptysis, dyspnea,wheezing  CV:    negative for chest pain, palpitations, lower extremity edema  GI:    negative for nausea, vomiting, diarrhea, abdominal pain,melena  Endo:               negative for polyuria,polydipsia,polyphagia,heat intolerance  Genitourinary : negative for frequency, dysuria and hematuria  Integumentary: negative for rash and pruritus  Hematologic:   negative for easy bruising and gum/nose bleeding  Musculoskel:  negative for myalgias, arthralgias, muscle weakness, joint pain  Neurological:   negative for headaches, dizziness, vertigo, memory problems and gait   Behavl/Psych:  negative for feelings of anxiety, depression, mood changes  ROS otherwise negative      Objective:  Visit Vitals  /68 (BP 1 Location: Left upper arm, BP Patient Position: Sitting, BP Cuff Size: Adult)   Pulse 82   Temp 98.4 °F (36.9 °C) (Oral)   Resp 20   Ht 5' 3\" (1.6 m)   Wt 192 lb 12.8 oz (87.5 kg)   LMP 08/27/2022   SpO2 98%   BMI 34.15 kg/m²     Physical Exam:   General appearance - alert, well appearing, and in no distress  Mental status - alert, oriented to person, place, and time  EYE-PHYLLIS, EOMI, fundi normal, corneas normal, no foreign bodies  ENT-ENT exam normal, no neck nodes or sinus tenderness  Nose - normal and patent, no erythema, discharge or polyps  Mouth - mucous membranes moist, pharynx normal without lesions  Neck - supple, no significant adenopathy   Chest - clear to auscultation, no wheezes, rales or rhonchi, symmetric air entry   Heart - normal rate, regular rhythm, normal S1, S2, no murmurs, rubs, clicks or gallops   Abdomen - soft, nontender, nondistended, no masses or organomegaly, no CVA tenderness, mild tenderness to the left flank with direct palpation with no skin changes and no calor or erythema  Lymph- no adenopathy palpable  Ext-peripheral pulses normal, no pedal edema, no clubbing or cyanosis  Skin-Warm and dry. no hyperpigmentation, vitiligo, or suspicious lesions  Neuro -alert, oriented, normal speech, no focal findings or movement disorder noted      Assessment/Plan:  Diagnoses and all orders for this visit:    1. Left flank pain  -     CBC WITH AUTOMATED DIFF; Future  -     URINALYSIS W/ RFLX MICROSCOPIC; Future  -     METABOLIC PANEL, BASIC; Future    Other orders  -     ketorolac (TORADOL) 10 mg tablet; Take 1 Tablet by mouth three (3) times daily as needed for Pain. Indications: excessive pain  -     tamsulosin (FLOMAX) 0.4 mg capsule; Take 1 Capsule by mouth daily. Indications: stones in the urinary tract          ICD-10-CM ICD-9-CM    1. Left flank pain  R10.9 789.09 CBC WITH AUTOMATED DIFF      URINALYSIS W/ RFLX MICROSCOPIC      METABOLIC PANEL, BASIC          Plan:    Differential includes nephrolithiasis, renal colic, musculoskeletal pain, muscle spasm, costochondritis, UTI, GYN related pain.   Start empirically on Toradol and Flomax. Follow-up urine analysis, CBC, and BMP. If symptoms or not improved may consider further testing if indicated. I have reviewed with the patient details of the assessment and plan and all questions were answered. Relevent patient education was performed. Verbal and/or written instructions (see AVS) provided. The most recent lab findings were reviewed with the patient. Plan was discussed with patient who verbally expressed understanding. An After Visit Summary was printed and given to the patient.     King Dunaway MD

## 2022-09-08 ENCOUNTER — TELEPHONE (OUTPATIENT)
Dept: INTERNAL MEDICINE CLINIC | Age: 47
End: 2022-09-08

## 2022-09-08 RX ORDER — CIPROFLOXACIN 500 MG/1
500 TABLET ORAL 2 TIMES DAILY
Qty: 10 TABLET | Refills: 0 | Status: SHIPPED | OUTPATIENT
Start: 2022-09-08 | End: 2022-09-13

## 2022-09-08 NOTE — TELEPHONE ENCOUNTER
Patient called to request her lab results from 9/7/22. She states the pain is no better. Please advise.

## 2023-02-07 ENCOUNTER — TELEPHONE (OUTPATIENT)
Dept: INTERNAL MEDICINE CLINIC | Age: 48
End: 2023-02-07

## 2023-02-07 NOTE — TELEPHONE ENCOUNTER
Pt has had strep for a week. She did a virtual visit with Radha and they diagnosed her with strep. She has an eye infection as well, and saw her optometrist, but they stated it was more than likely viral.   Radha put her on Keflex 500mg caps 1 po bid as well as tylenol/motrin. They suggested salt water gargle as well. Pt is still not feeling well, and is requesting a medrol dose pack be sent to the pharmacy on file. 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 She also states her vision is blurry due to the eye infection. She has been out from work since 2/1/23. Please advise.

## 2023-05-19 RX ORDER — KETOROLAC TROMETHAMINE 10 MG/1
10 TABLET, FILM COATED ORAL 3 TIMES DAILY PRN
COMMUNITY
Start: 2022-09-07

## 2023-05-19 RX ORDER — CETIRIZINE HYDROCHLORIDE 10 MG/1
TABLET ORAL
COMMUNITY

## 2023-05-19 RX ORDER — ALBUTEROL SULFATE 90 UG/1
2 AEROSOL, METERED RESPIRATORY (INHALATION) EVERY 4 HOURS PRN
COMMUNITY
Start: 2022-01-20

## 2023-05-19 RX ORDER — TAMSULOSIN HYDROCHLORIDE 0.4 MG/1
0.4 CAPSULE ORAL DAILY
COMMUNITY
Start: 2022-09-07